# Patient Record
Sex: MALE | Race: WHITE | NOT HISPANIC OR LATINO | Employment: OTHER
[De-identification: names, ages, dates, MRNs, and addresses within clinical notes are randomized per-mention and may not be internally consistent; named-entity substitution may affect disease eponyms.]

---

## 2018-12-03 ENCOUNTER — RECORDS - HEALTHEAST (OUTPATIENT)
Dept: ADMINISTRATIVE | Facility: OTHER | Age: 68
End: 2018-12-03

## 2018-12-07 ENCOUNTER — RECORDS - HEALTHEAST (OUTPATIENT)
Dept: ADMINISTRATIVE | Facility: OTHER | Age: 68
End: 2018-12-07

## 2018-12-17 ENCOUNTER — HOSPITAL ENCOUNTER (OUTPATIENT)
Dept: CARDIOLOGY | Facility: CLINIC | Age: 68
Discharge: HOME | End: 2018-12-17

## 2018-12-17 DIAGNOSIS — I45.10 RBBB: ICD-10-CM

## 2018-12-17 DIAGNOSIS — R07.9 CHEST PAIN: ICD-10-CM

## 2018-12-17 LAB
CV STRESS CURRENT BP HE: NORMAL
CV STRESS CURRENT HR HE: 103
CV STRESS CURRENT HR HE: 107
CV STRESS CURRENT HR HE: 107
CV STRESS CURRENT HR HE: 109
CV STRESS CURRENT HR HE: 110
CV STRESS CURRENT HR HE: 114
CV STRESS CURRENT HR HE: 126
CV STRESS CURRENT HR HE: 132
CV STRESS CURRENT HR HE: 135
CV STRESS CURRENT HR HE: 145
CV STRESS CURRENT HR HE: 146
CV STRESS CURRENT HR HE: 148
CV STRESS CURRENT HR HE: 148
CV STRESS CURRENT HR HE: 153
CV STRESS CURRENT HR HE: 78
CV STRESS CURRENT HR HE: 89
CV STRESS CURRENT HR HE: 92
CV STRESS CURRENT HR HE: 93
CV STRESS CURRENT HR HE: 94
CV STRESS CURRENT HR HE: 94
CV STRESS CURRENT HR HE: 98
CV STRESS DEVIATION TIME HE: NORMAL
CV STRESS ECHO PERCENT HR HE: NORMAL
CV STRESS EXERCISE STAGE HE: NORMAL
CV STRESS EXERCISE STAGE REACHED HE: NORMAL
CV STRESS FINAL RESTING BP HE: NORMAL
CV STRESS FINAL RESTING HR HE: 93
CV STRESS MAX HR HE: 156
CV STRESS MAX TREADMILL GRADE HE: 12
CV STRESS MAX TREADMILL SPEED HE: 2.5
CV STRESS PEAK DIA BP HE: NORMAL
CV STRESS PEAK SYS BP HE: NORMAL
CV STRESS PHASE HE: NORMAL
CV STRESS PROTOCOL HE: NORMAL
CV STRESS RESTING PT POSITION HE: NORMAL
CV STRESS RESTING PT POSITION HE: NORMAL
CV STRESS ST DEVIATION AMOUNT HE: NORMAL
CV STRESS ST DEVIATION ELEVATION HE: NORMAL
CV STRESS ST EVELATION AMOUNT HE: NORMAL
CV STRESS TEST TYPE HE: NORMAL
CV STRESS TOTAL STAGE TIME MIN 1 HE: NORMAL
STRESS ECHO BASELINE BP: NORMAL
STRESS ECHO BASELINE HR: 90
STRESS ECHO CALCULATED PERCENT HR: 102 %
STRESS ECHO LAST STRESS BP: NORMAL
STRESS ECHO LAST STRESS HR: 135
STRESS ECHO POST ESTIMATED WORKLOAD: 6.4
STRESS ECHO POST EXERCISE DUR MIN: 4
STRESS ECHO POST EXERCISE DUR SEC: 31
STRESS ECHO TARGET HR: 156.06

## 2018-12-17 RX ORDER — VARENICLINE TARTRATE 1 MG/1
1 TABLET, FILM COATED ORAL 2 TIMES DAILY
Status: SHIPPED | COMMUNITY
Start: 2018-12-17 | End: 2022-10-04

## 2018-12-20 ENCOUNTER — RECORDS - HEALTHEAST (OUTPATIENT)
Dept: ADMINISTRATIVE | Facility: OTHER | Age: 68
End: 2018-12-20

## 2018-12-28 ENCOUNTER — COMMUNICATION - HEALTHEAST (OUTPATIENT)
Dept: TELEHEALTH | Facility: CLINIC | Age: 68
End: 2018-12-28

## 2018-12-28 ENCOUNTER — HOSPITAL ENCOUNTER (OUTPATIENT)
Dept: CT IMAGING | Facility: HOSPITAL | Age: 68
Discharge: HOME | End: 2018-12-28

## 2018-12-28 ENCOUNTER — HOSPITAL ENCOUNTER (OUTPATIENT)
Dept: CARDIOLOGY | Facility: HOSPITAL | Age: 68
Discharge: HOME | End: 2018-12-28

## 2018-12-28 DIAGNOSIS — J98.4 CHRONIC PULMONARY DISEASE: ICD-10-CM

## 2018-12-28 DIAGNOSIS — R06.00 DYSPNEA: ICD-10-CM

## 2018-12-28 DIAGNOSIS — R07.9 CHEST PAIN: ICD-10-CM

## 2018-12-28 DIAGNOSIS — J44.9 CHRONIC OBSTRUCTIVE PULMONARY DISEASE (COPD) (H): ICD-10-CM

## 2018-12-28 DIAGNOSIS — I51.9 HEART DISEASE: ICD-10-CM

## 2018-12-28 LAB
AORTIC ROOT: 2.6 CM
AORTIC VALVE MEAN VELOCITY: 76.3 CM/S
AV DIMENSIONLESS INDEX VTI: 0.5
AV MEAN GRADIENT: 3 MMHG
AV PEAK GRADIENT: 4.5 MMHG
AV VALVE AREA: 2.3 CM2
AV VELOCITY RATIO: 0.6
BSA FOR ECHO PROCEDURE: 1.89 M2
CV BLOOD PRESSURE: NORMAL MMHG
CV ECHO HEIGHT: 73 IN
CV ECHO WEIGHT: 154 LBS
DOP CALC AO PEAK VEL: 106 CM/S
DOP CALC AO VTI: 23.6 CM
DOP CALC LVOT AREA: 4.52 CM2
DOP CALC LVOT DIAMETER: 2.4 CM
DOP CALC LVOT PEAK VEL: 64.9 CM/S
DOP CALC LVOT STROKE VOLUME: 55.2 CM3
DOP CALCLVOT PEAK VEL VTI: 12.2 CM
ECHO EJECTION FRACTION ESTIMATED: 50 %
EJECTION FRACTION: 60 % (ref 55–75)
FRACTIONAL SHORTENING: 24.7 % (ref 28–44)
INTERVENTRICULAR SEPTUM IN END DIASTOLE: 0.64 CM (ref 0.6–1)
IVS/PW RATIO: 0.9
LA AREA 1: 11.1 CM2
LA AREA 2: 14 CM2
LEFT ATRIUM LENGTH: 3.9 CM
LEFT ATRIUM SIZE: 2.8 CM
LEFT ATRIUM VOLUME INDEX: 17.9 ML/M2
LEFT ATRIUM VOLUME: 33.9 ML
LEFT VENTRICLE DIASTOLIC VOLUME INDEX: 36.7 CM3/M2 (ref 34–74)
LEFT VENTRICLE DIASTOLIC VOLUME: 69.4 CM3 (ref 62–150)
LEFT VENTRICLE MASS INDEX: 53.6 G/M2
LEFT VENTRICLE SYSTOLIC VOLUME INDEX: 14.8 CM3/M2 (ref 11–31)
LEFT VENTRICLE SYSTOLIC VOLUME: 27.9 CM3 (ref 21–61)
LEFT VENTRICULAR INTERNAL DIMENSION IN DIASTOLE: 4.85 CM (ref 4.2–5.8)
LEFT VENTRICULAR INTERNAL DIMENSION IN SYSTOLE: 3.65 CM (ref 2.5–4)
LEFT VENTRICULAR MASS: 101.3 G
LEFT VENTRICULAR OUTFLOW TRACT MEAN GRADIENT: 1 MMHG
LEFT VENTRICULAR OUTFLOW TRACT MEAN VELOCITY: 46.2 CM/S
LEFT VENTRICULAR OUTFLOW TRACT PEAK GRADIENT: 2 MMHG
LEFT VENTRICULAR POSTERIOR WALL IN END DIASTOLE: 0.69 CM (ref 0.6–1)
LV STROKE VOLUME INDEX: 29.2 ML/M2
MITRAL VALVE DECELERATION SLOPE: 1370 MM/S2
MITRAL VALVE E/A RATIO: 0.8
MITRAL VALVE PRESSURE HALF-TIME: 121 MS
MV DECELERATION TIME: 412 MS
MV PEAK A VELOCITY: 69.8 CM/S
MV PEAK E VELOCITY: 56.3 CM/S
MV VALVE AREA PRESSURE 1/2 METHOD: 1.8 CM2
NUC REST DIASTOLIC VOLUME INDEX: 2464 LBS
NUC REST SYSTOLIC VOLUME INDEX: 73 IN
PR MAX PG: 4 MMHG
PR PEAK VELOCITY: 105 CM/S
TRICUSPID REGURGITATION PEAK PRESSURE GRADIENT: 19.9 MMHG
TRICUSPID VALVE ANULAR PLANE SYSTOLIC EXCURSION: 2.2 CM
TRICUSPID VALVE PEAK REGURGITANT VELOCITY: 223 CM/S

## 2018-12-28 ASSESSMENT — MIFFLIN-ST. JEOR: SCORE: 1512.42

## 2021-06-02 VITALS — WEIGHT: 154 LBS | HEIGHT: 73 IN | BODY MASS INDEX: 20.41 KG/M2

## 2022-10-04 ENCOUNTER — ANESTHESIA (OUTPATIENT)
Dept: SURGERY | Facility: CLINIC | Age: 72
End: 2022-10-04
Payer: COMMERCIAL

## 2022-10-04 ENCOUNTER — APPOINTMENT (OUTPATIENT)
Dept: CARDIOLOGY | Facility: CLINIC | Age: 72
End: 2022-10-04
Attending: STUDENT IN AN ORGANIZED HEALTH CARE EDUCATION/TRAINING PROGRAM
Payer: COMMERCIAL

## 2022-10-04 ENCOUNTER — HOSPITAL ENCOUNTER (OUTPATIENT)
Facility: CLINIC | Age: 72
Discharge: HOME | End: 2022-10-05
Attending: EMERGENCY MEDICINE | Admitting: SURGERY
Payer: COMMERCIAL

## 2022-10-04 ENCOUNTER — APPOINTMENT (OUTPATIENT)
Dept: CT IMAGING | Facility: CLINIC | Age: 72
End: 2022-10-04
Attending: EMERGENCY MEDICINE
Payer: COMMERCIAL

## 2022-10-04 ENCOUNTER — ANESTHESIA EVENT (OUTPATIENT)
Dept: SURGERY | Facility: CLINIC | Age: 72
End: 2022-10-04
Payer: COMMERCIAL

## 2022-10-04 DIAGNOSIS — K35.80 ACUTE APPENDICITIS, UNSPECIFIED ACUTE APPENDICITIS TYPE: Primary | ICD-10-CM

## 2022-10-04 DIAGNOSIS — R10.31 ABDOMINAL PAIN, RIGHT LOWER QUADRANT: ICD-10-CM

## 2022-10-04 PROBLEM — I45.10 INCOMPLETE RIGHT BUNDLE BRANCH BLOCK (RBBB) DETERMINED BY ELECTROCARDIOGRAPHY: Status: ACTIVE | Noted: 2018-11-28

## 2022-10-04 PROBLEM — E87.1 HYPONATREMIA: Status: ACTIVE | Noted: 2022-10-04

## 2022-10-04 PROBLEM — D64.9 NORMOCYTIC ANEMIA: Status: ACTIVE | Noted: 2022-10-04

## 2022-10-04 PROBLEM — I45.10 RIGHT BUNDLE BRANCH BLOCK: Status: ACTIVE | Noted: 2022-10-04

## 2022-10-04 PROBLEM — J44.9 CHRONIC OBSTRUCTIVE PULMONARY DISEASE (H): Status: ACTIVE | Noted: 2021-09-15

## 2022-10-04 PROBLEM — E88.09 HYPOALBUMINEMIA: Status: ACTIVE | Noted: 2022-10-04

## 2022-10-04 PROBLEM — R82.90 ABNORMAL URINALYSIS: Status: ACTIVE | Noted: 2022-10-04

## 2022-10-04 PROBLEM — I77.819 AORTIC ECTASIA (H): Status: ACTIVE | Noted: 2021-10-07

## 2022-10-04 PROBLEM — R79.82 ELEVATED C-REACTIVE PROTEIN (CRP): Status: ACTIVE | Noted: 2022-10-04

## 2022-10-04 PROBLEM — K35.209 ACUTE APPENDICITIS WITH GENERALIZED PERITONITIS: Status: ACTIVE | Noted: 2022-10-04

## 2022-10-04 PROBLEM — D72.829 LEUKOCYTOSIS: Status: ACTIVE | Noted: 2022-10-04

## 2022-10-04 PROBLEM — F10.10 ALCOHOL ABUSE: Status: ACTIVE | Noted: 2022-10-04

## 2022-10-04 PROBLEM — N17.9 ACUTE KIDNEY INJURY (H): Status: ACTIVE | Noted: 2022-10-04

## 2022-10-04 PROBLEM — Z72.0 TOBACCO ABUSE: Status: ACTIVE | Noted: 2022-10-04

## 2022-10-04 LAB
ALBUMIN SERPL-MCNC: 3.3 G/DL (ref 3.5–5)
ALBUMIN UR-MCNC: 30 MG/DL
ALP SERPL-CCNC: 51 U/L (ref 45–120)
ALT SERPL W P-5'-P-CCNC: 10 U/L (ref 0–45)
ANION GAP SERPL CALCULATED.3IONS-SCNC: 10 MMOL/L (ref 5–18)
APPEARANCE UR: CLEAR
APTT PPP: 22 SECONDS (ref 22–38)
AST SERPL W P-5'-P-CCNC: 11 U/L (ref 0–40)
ATRIAL RATE - MUSE: 86 BPM
BASOPHILS # BLD AUTO: 0 10E3/UL (ref 0–0.2)
BASOPHILS NFR BLD AUTO: 0 %
BILIRUB SERPL-MCNC: 1.1 MG/DL (ref 0–1)
BILIRUB UR QL STRIP: NEGATIVE
BUN SERPL-MCNC: 21 MG/DL (ref 8–28)
C REACTIVE PROTEIN LHE: 14.2 MG/DL (ref 0–?)
CALCIUM SERPL-MCNC: 9.3 MG/DL (ref 8.5–10.5)
CHLORIDE BLD-SCNC: 101 MMOL/L (ref 98–107)
CO2 SERPL-SCNC: 24 MMOL/L (ref 22–31)
COLOR UR AUTO: ABNORMAL
CREAT SERPL-MCNC: 1.33 MG/DL (ref 0.7–1.3)
DIASTOLIC BLOOD PRESSURE - MUSE: 55 MMHG
EOSINOPHIL # BLD AUTO: 0 10E3/UL (ref 0–0.7)
EOSINOPHIL NFR BLD AUTO: 0 %
ERYTHROCYTE [DISTWIDTH] IN BLOOD BY AUTOMATED COUNT: 12.9 % (ref 10–15)
GFR SERPL CREATININE-BSD FRML MDRD: 57 ML/MIN/1.73M2
GLUCOSE BLD-MCNC: 141 MG/DL (ref 70–125)
GLUCOSE UR STRIP-MCNC: NEGATIVE MG/DL
HCT VFR BLD AUTO: 35.8 % (ref 40–53)
HGB BLD-MCNC: 12.7 G/DL (ref 13.3–17.7)
HGB UR QL STRIP: NEGATIVE
IMM GRANULOCYTES # BLD: 0 10E3/UL
IMM GRANULOCYTES NFR BLD: 0 %
INR PPP: 1.03 (ref 0.85–1.15)
INR PPP: 1.05 (ref 0.85–1.15)
INTERPRETATION ECG - MUSE: NORMAL
KETONES UR STRIP-MCNC: ABNORMAL MG/DL
LACTATE SERPL-SCNC: 1.4 MMOL/L (ref 0.7–2)
LEUKOCYTE ESTERASE UR QL STRIP: NEGATIVE
LIPASE SERPL-CCNC: 23 U/L (ref 0–52)
LVEF ECHO: NORMAL
LYMPHOCYTES # BLD AUTO: 0.6 10E3/UL (ref 0.8–5.3)
LYMPHOCYTES NFR BLD AUTO: 4 %
MCH RBC QN AUTO: 34 PG (ref 26.5–33)
MCHC RBC AUTO-ENTMCNC: 35.5 G/DL (ref 31.5–36.5)
MCV RBC AUTO: 96 FL (ref 78–100)
MONOCYTES # BLD AUTO: 0.8 10E3/UL (ref 0–1.3)
MONOCYTES NFR BLD AUTO: 5 %
NEUTROPHILS # BLD AUTO: 12.9 10E3/UL (ref 1.6–8.3)
NEUTROPHILS NFR BLD AUTO: 91 %
NITRATE UR QL: NEGATIVE
NRBC # BLD AUTO: 0 10E3/UL
NRBC BLD AUTO-RTO: 0 /100
P AXIS - MUSE: 79 DEGREES
PH UR STRIP: 8 [PH] (ref 5–7)
PLATELET # BLD AUTO: 182 10E3/UL (ref 150–450)
POTASSIUM BLD-SCNC: 4.1 MMOL/L (ref 3.5–5)
PR INTERVAL - MUSE: 124 MS
PROT SERPL-MCNC: 7.2 G/DL (ref 6–8)
QRS DURATION - MUSE: 122 MS
QT - MUSE: 372 MS
QTC - MUSE: 445 MS
R AXIS - MUSE: -69 DEGREES
RBC # BLD AUTO: 3.73 10E6/UL (ref 4.4–5.9)
RBC URINE: 3 /HPF
SARS-COV-2 RNA RESP QL NAA+PROBE: NEGATIVE
SODIUM SERPL-SCNC: 135 MMOL/L (ref 136–145)
SP GR UR STRIP: >1.05 (ref 1–1.03)
SYSTOLIC BLOOD PRESSURE - MUSE: 97 MMHG
T AXIS - MUSE: 30 DEGREES
TROPONIN I SERPL-MCNC: <0.01 NG/ML (ref 0–0.29)
UROBILINOGEN UR STRIP-MCNC: 3 MG/DL
VENTRICULAR RATE- MUSE: 86 BPM
WBC # BLD AUTO: 14.3 10E3/UL (ref 4–11)
WBC URINE: 1 /HPF

## 2022-10-04 PROCEDURE — 81001 URINALYSIS AUTO W/SCOPE: CPT | Performed by: EMERGENCY MEDICINE

## 2022-10-04 PROCEDURE — 250N000011 HC RX IP 250 OP 636: Performed by: EMERGENCY MEDICINE

## 2022-10-04 PROCEDURE — 74177 CT ABD & PELVIS W/CONTRAST: CPT

## 2022-10-04 PROCEDURE — 250N000011 HC RX IP 250 OP 636: Performed by: NURSE ANESTHETIST, CERTIFIED REGISTERED

## 2022-10-04 PROCEDURE — 36415 COLL VENOUS BLD VENIPUNCTURE: CPT | Performed by: EMERGENCY MEDICINE

## 2022-10-04 PROCEDURE — 258N000003 HC RX IP 258 OP 636: Performed by: EMERGENCY MEDICINE

## 2022-10-04 PROCEDURE — 250N000025 HC SEVOFLURANE, PER MIN: Performed by: SURGERY

## 2022-10-04 PROCEDURE — G0378 HOSPITAL OBSERVATION PER HR: HCPCS

## 2022-10-04 PROCEDURE — 85610 PROTHROMBIN TIME: CPT | Performed by: EMERGENCY MEDICINE

## 2022-10-04 PROCEDURE — 96361 HYDRATE IV INFUSION ADD-ON: CPT

## 2022-10-04 PROCEDURE — 83605 ASSAY OF LACTIC ACID: CPT | Performed by: EMERGENCY MEDICINE

## 2022-10-04 PROCEDURE — 80053 COMPREHEN METABOLIC PANEL: CPT | Performed by: EMERGENCY MEDICINE

## 2022-10-04 PROCEDURE — 88304 TISSUE EXAM BY PATHOLOGIST: CPT | Mod: TC | Performed by: SURGERY

## 2022-10-04 PROCEDURE — 250N000013 HC RX MED GY IP 250 OP 250 PS 637: Performed by: SURGERY

## 2022-10-04 PROCEDURE — 85025 COMPLETE CBC W/AUTO DIFF WBC: CPT | Performed by: EMERGENCY MEDICINE

## 2022-10-04 PROCEDURE — 258N000003 HC RX IP 258 OP 636: Performed by: NURSE ANESTHETIST, CERTIFIED REGISTERED

## 2022-10-04 PROCEDURE — 370N000017 HC ANESTHESIA TECHNICAL FEE, PER MIN: Performed by: SURGERY

## 2022-10-04 PROCEDURE — 250N000009 HC RX 250: Performed by: NURSE ANESTHETIST, CERTIFIED REGISTERED

## 2022-10-04 PROCEDURE — C9803 HOPD COVID-19 SPEC COLLECT: HCPCS

## 2022-10-04 PROCEDURE — 93306 TTE W/DOPPLER COMPLETE: CPT

## 2022-10-04 PROCEDURE — 99220 PR INITIAL OBSERVATION CARE,LEVEL III: CPT | Performed by: STUDENT IN AN ORGANIZED HEALTH CARE EDUCATION/TRAINING PROGRAM

## 2022-10-04 PROCEDURE — 360N000076 HC SURGERY LEVEL 3, PER MIN: Performed by: SURGERY

## 2022-10-04 PROCEDURE — 44970 LAPAROSCOPY APPENDECTOMY: CPT | Performed by: SURGERY

## 2022-10-04 PROCEDURE — 93306 TTE W/DOPPLER COMPLETE: CPT | Mod: 26 | Performed by: INTERNAL MEDICINE

## 2022-10-04 PROCEDURE — 83690 ASSAY OF LIPASE: CPT | Performed by: EMERGENCY MEDICINE

## 2022-10-04 PROCEDURE — 250N000011 HC RX IP 250 OP 636: Performed by: STUDENT IN AN ORGANIZED HEALTH CARE EDUCATION/TRAINING PROGRAM

## 2022-10-04 PROCEDURE — 84484 ASSAY OF TROPONIN QUANT: CPT | Performed by: EMERGENCY MEDICINE

## 2022-10-04 PROCEDURE — 258N000003 HC RX IP 258 OP 636: Performed by: ANESTHESIOLOGY

## 2022-10-04 PROCEDURE — 96375 TX/PRO/DX INJ NEW DRUG ADDON: CPT | Mod: 59

## 2022-10-04 PROCEDURE — 86140 C-REACTIVE PROTEIN: CPT | Performed by: EMERGENCY MEDICINE

## 2022-10-04 PROCEDURE — 250N000011 HC RX IP 250 OP 636: Performed by: SURGERY

## 2022-10-04 PROCEDURE — 999N000141 HC STATISTIC PRE-PROCEDURE NURSING ASSESSMENT: Performed by: SURGERY

## 2022-10-04 PROCEDURE — 96374 THER/PROPH/DIAG INJ IV PUSH: CPT | Mod: 59

## 2022-10-04 PROCEDURE — 710N000010 HC RECOVERY PHASE 1, LEVEL 2, PER MIN: Performed by: SURGERY

## 2022-10-04 PROCEDURE — 258N000003 HC RX IP 258 OP 636: Performed by: STUDENT IN AN ORGANIZED HEALTH CARE EDUCATION/TRAINING PROGRAM

## 2022-10-04 PROCEDURE — 85730 THROMBOPLASTIN TIME PARTIAL: CPT | Performed by: EMERGENCY MEDICINE

## 2022-10-04 PROCEDURE — 272N000001 HC OR GENERAL SUPPLY STERILE: Performed by: SURGERY

## 2022-10-04 PROCEDURE — 99205 OFFICE O/P NEW HI 60 MIN: CPT | Mod: 57 | Performed by: SURGERY

## 2022-10-04 PROCEDURE — 99285 EMERGENCY DEPT VISIT HI MDM: CPT | Mod: 25

## 2022-10-04 PROCEDURE — U0003 INFECTIOUS AGENT DETECTION BY NUCLEIC ACID (DNA OR RNA); SEVERE ACUTE RESPIRATORY SYNDROME CORONAVIRUS 2 (SARS-COV-2) (CORONAVIRUS DISEASE [COVID-19]), AMPLIFIED PROBE TECHNIQUE, MAKING USE OF HIGH THROUGHPUT TECHNOLOGIES AS DESCRIBED BY CMS-2020-01-R: HCPCS | Performed by: EMERGENCY MEDICINE

## 2022-10-04 PROCEDURE — 93005 ELECTROCARDIOGRAM TRACING: CPT | Performed by: EMERGENCY MEDICINE

## 2022-10-04 RX ORDER — ACETAMINOPHEN 325 MG/1
975 TABLET ORAL EVERY 8 HOURS
Status: DISCONTINUED | OUTPATIENT
Start: 2022-10-04 | End: 2022-10-05 | Stop reason: HOSPADM

## 2022-10-04 RX ORDER — CEFAZOLIN SODIUM/WATER 2 G/20 ML
2 SYRINGE (ML) INTRAVENOUS SEE ADMIN INSTRUCTIONS
Status: DISCONTINUED | OUTPATIENT
Start: 2022-10-04 | End: 2022-10-04 | Stop reason: HOSPADM

## 2022-10-04 RX ORDER — LIDOCAINE 40 MG/G
CREAM TOPICAL
Status: DISCONTINUED | OUTPATIENT
Start: 2022-10-04 | End: 2022-10-04 | Stop reason: HOSPADM

## 2022-10-04 RX ORDER — CEFAZOLIN SODIUM/WATER 2 G/20 ML
2 SYRINGE (ML) INTRAVENOUS
Status: COMPLETED | OUTPATIENT
Start: 2022-10-04 | End: 2022-10-04

## 2022-10-04 RX ORDER — POLYETHYLENE GLYCOL 3350 17 G/17G
17 POWDER, FOR SOLUTION ORAL DAILY
Status: DISCONTINUED | OUTPATIENT
Start: 2022-10-05 | End: 2022-10-05 | Stop reason: HOSPADM

## 2022-10-04 RX ORDER — PROCHLORPERAZINE MALEATE 5 MG
5 TABLET ORAL EVERY 6 HOURS PRN
Status: DISCONTINUED | OUTPATIENT
Start: 2022-10-04 | End: 2022-10-05 | Stop reason: HOSPADM

## 2022-10-04 RX ORDER — SODIUM CHLORIDE, SODIUM LACTATE, POTASSIUM CHLORIDE, CALCIUM CHLORIDE 600; 310; 30; 20 MG/100ML; MG/100ML; MG/100ML; MG/100ML
INJECTION, SOLUTION INTRAVENOUS CONTINUOUS
Status: DISCONTINUED | OUTPATIENT
Start: 2022-10-04 | End: 2022-10-04 | Stop reason: HOSPADM

## 2022-10-04 RX ORDER — PROPOFOL 10 MG/ML
INJECTION, EMULSION INTRAVENOUS PRN
Status: DISCONTINUED | OUTPATIENT
Start: 2022-10-04 | End: 2022-10-04

## 2022-10-04 RX ORDER — MORPHINE SULFATE 4 MG/ML
4 INJECTION, SOLUTION INTRAMUSCULAR; INTRAVENOUS ONCE
Status: COMPLETED | OUTPATIENT
Start: 2022-10-04 | End: 2022-10-04

## 2022-10-04 RX ORDER — ONDANSETRON 2 MG/ML
4 INJECTION INTRAMUSCULAR; INTRAVENOUS EVERY 6 HOURS PRN
Status: DISCONTINUED | OUTPATIENT
Start: 2022-10-04 | End: 2022-10-05 | Stop reason: HOSPADM

## 2022-10-04 RX ORDER — PIPERACILLIN SODIUM, TAZOBACTAM SODIUM 3; .375 G/15ML; G/15ML
3.38 INJECTION, POWDER, LYOPHILIZED, FOR SOLUTION INTRAVENOUS EVERY 8 HOURS
Status: DISCONTINUED | OUTPATIENT
Start: 2022-10-04 | End: 2022-10-05 | Stop reason: HOSPADM

## 2022-10-04 RX ORDER — OXYCODONE HYDROCHLORIDE 5 MG/1
5 TABLET ORAL EVERY 4 HOURS PRN
Status: DISCONTINUED | OUTPATIENT
Start: 2022-10-04 | End: 2022-10-05 | Stop reason: HOSPADM

## 2022-10-04 RX ORDER — BUPIVACAINE HYDROCHLORIDE 2.5 MG/ML
INJECTION, SOLUTION EPIDURAL; INFILTRATION; INTRACAUDAL PRN
Status: DISCONTINUED | OUTPATIENT
Start: 2022-10-04 | End: 2022-10-04 | Stop reason: HOSPADM

## 2022-10-04 RX ORDER — ONDANSETRON 4 MG/1
4 TABLET, ORALLY DISINTEGRATING ORAL EVERY 6 HOURS PRN
Status: DISCONTINUED | OUTPATIENT
Start: 2022-10-04 | End: 2022-10-05 | Stop reason: HOSPADM

## 2022-10-04 RX ORDER — OXYCODONE HYDROCHLORIDE 5 MG/1
5 TABLET ORAL EVERY 4 HOURS PRN
Status: DISCONTINUED | OUTPATIENT
Start: 2022-10-04 | End: 2022-10-04 | Stop reason: HOSPADM

## 2022-10-04 RX ORDER — AMOXICILLIN 250 MG
1 CAPSULE ORAL 2 TIMES DAILY
Status: DISCONTINUED | OUTPATIENT
Start: 2022-10-04 | End: 2022-10-05 | Stop reason: HOSPADM

## 2022-10-04 RX ORDER — HYDROMORPHONE HCL IN WATER/PF 6 MG/30 ML
0.2 PATIENT CONTROLLED ANALGESIA SYRINGE INTRAVENOUS
Status: DISCONTINUED | OUTPATIENT
Start: 2022-10-04 | End: 2022-10-05 | Stop reason: HOSPADM

## 2022-10-04 RX ORDER — MEPERIDINE HYDROCHLORIDE 25 MG/ML
12.5 INJECTION INTRAMUSCULAR; INTRAVENOUS; SUBCUTANEOUS
Status: DISCONTINUED | OUTPATIENT
Start: 2022-10-04 | End: 2022-10-04 | Stop reason: HOSPADM

## 2022-10-04 RX ORDER — ONDANSETRON 2 MG/ML
4 INJECTION INTRAMUSCULAR; INTRAVENOUS EVERY 30 MIN PRN
Status: DISCONTINUED | OUTPATIENT
Start: 2022-10-04 | End: 2022-10-04 | Stop reason: HOSPADM

## 2022-10-04 RX ORDER — ONDANSETRON 2 MG/ML
INJECTION INTRAMUSCULAR; INTRAVENOUS PRN
Status: DISCONTINUED | OUTPATIENT
Start: 2022-10-04 | End: 2022-10-04

## 2022-10-04 RX ORDER — FENTANYL CITRATE 50 UG/ML
25 INJECTION, SOLUTION INTRAMUSCULAR; INTRAVENOUS EVERY 5 MIN PRN
Status: DISCONTINUED | OUTPATIENT
Start: 2022-10-04 | End: 2022-10-04 | Stop reason: HOSPADM

## 2022-10-04 RX ORDER — IOPAMIDOL 755 MG/ML
75 INJECTION, SOLUTION INTRAVASCULAR ONCE
Status: COMPLETED | OUTPATIENT
Start: 2022-10-04 | End: 2022-10-04

## 2022-10-04 RX ORDER — FENTANYL CITRATE 50 UG/ML
INJECTION, SOLUTION INTRAMUSCULAR; INTRAVENOUS PRN
Status: DISCONTINUED | OUTPATIENT
Start: 2022-10-04 | End: 2022-10-04

## 2022-10-04 RX ORDER — ONDANSETRON 4 MG/1
4 TABLET, ORALLY DISINTEGRATING ORAL EVERY 30 MIN PRN
Status: DISCONTINUED | OUTPATIENT
Start: 2022-10-04 | End: 2022-10-04 | Stop reason: HOSPADM

## 2022-10-04 RX ORDER — PIPERACILLIN SODIUM, TAZOBACTAM SODIUM 3; .375 G/15ML; G/15ML
3.38 INJECTION, POWDER, LYOPHILIZED, FOR SOLUTION INTRAVENOUS ONCE
Status: COMPLETED | OUTPATIENT
Start: 2022-10-04 | End: 2022-10-04

## 2022-10-04 RX ORDER — DEXAMETHASONE SODIUM PHOSPHATE 10 MG/ML
INJECTION, SOLUTION INTRAMUSCULAR; INTRAVENOUS PRN
Status: DISCONTINUED | OUTPATIENT
Start: 2022-10-04 | End: 2022-10-04

## 2022-10-04 RX ORDER — BISACODYL 10 MG
10 SUPPOSITORY, RECTAL RECTAL DAILY PRN
Status: DISCONTINUED | OUTPATIENT
Start: 2022-10-04 | End: 2022-10-05 | Stop reason: HOSPADM

## 2022-10-04 RX ORDER — LIDOCAINE 40 MG/G
CREAM TOPICAL
Status: DISCONTINUED | OUTPATIENT
Start: 2022-10-04 | End: 2022-10-05 | Stop reason: HOSPADM

## 2022-10-04 RX ORDER — ACETAMINOPHEN 325 MG/1
650 TABLET ORAL EVERY 4 HOURS PRN
Status: DISCONTINUED | OUTPATIENT
Start: 2022-10-07 | End: 2022-10-05 | Stop reason: HOSPADM

## 2022-10-04 RX ORDER — HYDROMORPHONE HCL IN WATER/PF 6 MG/30 ML
0.2 PATIENT CONTROLLED ANALGESIA SYRINGE INTRAVENOUS EVERY 5 MIN PRN
Status: DISCONTINUED | OUTPATIENT
Start: 2022-10-04 | End: 2022-10-04 | Stop reason: HOSPADM

## 2022-10-04 RX ORDER — AMOXICILLIN 250 MG
1 CAPSULE ORAL 2 TIMES DAILY PRN
Status: DISCONTINUED | OUTPATIENT
Start: 2022-10-04 | End: 2022-10-05 | Stop reason: HOSPADM

## 2022-10-04 RX ORDER — FENTANYL CITRATE 50 UG/ML
25 INJECTION, SOLUTION INTRAMUSCULAR; INTRAVENOUS
Status: DISCONTINUED | OUTPATIENT
Start: 2022-10-04 | End: 2022-10-04

## 2022-10-04 RX ORDER — FENTANYL CITRATE 50 UG/ML
50 INJECTION, SOLUTION INTRAMUSCULAR; INTRAVENOUS
Status: DISCONTINUED | OUTPATIENT
Start: 2022-10-04 | End: 2022-10-04 | Stop reason: HOSPADM

## 2022-10-04 RX ORDER — LIDOCAINE HYDROCHLORIDE 10 MG/ML
INJECTION, SOLUTION INFILTRATION; PERINEURAL PRN
Status: DISCONTINUED | OUTPATIENT
Start: 2022-10-04 | End: 2022-10-04

## 2022-10-04 RX ORDER — AMOXICILLIN 250 MG
2 CAPSULE ORAL 2 TIMES DAILY PRN
Status: DISCONTINUED | OUTPATIENT
Start: 2022-10-04 | End: 2022-10-05 | Stop reason: HOSPADM

## 2022-10-04 RX ORDER — ACETAMINOPHEN 325 MG/1
975 TABLET ORAL ONCE
Status: COMPLETED | OUTPATIENT
Start: 2022-10-04 | End: 2022-10-04

## 2022-10-04 RX ORDER — ONDANSETRON 2 MG/ML
4 INJECTION INTRAMUSCULAR; INTRAVENOUS ONCE
Status: COMPLETED | OUTPATIENT
Start: 2022-10-04 | End: 2022-10-04

## 2022-10-04 RX ORDER — ENOXAPARIN SODIUM 100 MG/ML
40 INJECTION SUBCUTANEOUS EVERY 24 HOURS
Status: DISCONTINUED | OUTPATIENT
Start: 2022-10-05 | End: 2022-10-05 | Stop reason: HOSPADM

## 2022-10-04 RX ADMIN — DEXAMETHASONE SODIUM PHOSPHATE 10 MG: 10 INJECTION, SOLUTION INTRAMUSCULAR; INTRAVENOUS at 16:48

## 2022-10-04 RX ADMIN — PHENYLEPHRINE HYDROCHLORIDE 200 MCG: 10 INJECTION INTRAVENOUS at 16:49

## 2022-10-04 RX ADMIN — ONDANSETRON 4 MG: 2 INJECTION INTRAMUSCULAR; INTRAVENOUS at 05:30

## 2022-10-04 RX ADMIN — Medication 2 G: at 16:30

## 2022-10-04 RX ADMIN — LIDOCAINE HYDROCHLORIDE 4 ML: 10 INJECTION, SOLUTION INFILTRATION; PERINEURAL at 16:39

## 2022-10-04 RX ADMIN — PIPERACILLIN AND TAZOBACTAM 3.38 G: 3; .375 INJECTION, POWDER, FOR SOLUTION INTRAVENOUS at 22:47

## 2022-10-04 RX ADMIN — HYDROMORPHONE HYDROCHLORIDE 0.5 MG: 1 INJECTION, SOLUTION INTRAMUSCULAR; INTRAVENOUS; SUBCUTANEOUS at 17:14

## 2022-10-04 RX ADMIN — FENTANYL CITRATE 100 MCG: 50 INJECTION, SOLUTION INTRAMUSCULAR; INTRAVENOUS at 16:39

## 2022-10-04 RX ADMIN — SODIUM CHLORIDE 1000 ML: 9 INJECTION, SOLUTION INTRAVENOUS at 09:37

## 2022-10-04 RX ADMIN — ACETAMINOPHEN 975 MG: 325 TABLET, FILM COATED ORAL at 16:02

## 2022-10-04 RX ADMIN — SODIUM CHLORIDE 500 ML: 9 INJECTION, SOLUTION INTRAVENOUS at 15:03

## 2022-10-04 RX ADMIN — SENNOSIDES AND DOCUSATE SODIUM 1 TABLET: 50; 8.6 TABLET ORAL at 19:21

## 2022-10-04 RX ADMIN — PHENYLEPHRINE HYDROCHLORIDE 200 MCG: 10 INJECTION INTRAVENOUS at 16:52

## 2022-10-04 RX ADMIN — MORPHINE SULFATE 4 MG: 4 INJECTION, SOLUTION INTRAMUSCULAR; INTRAVENOUS at 05:31

## 2022-10-04 RX ADMIN — SODIUM CHLORIDE, POTASSIUM CHLORIDE, SODIUM LACTATE AND CALCIUM CHLORIDE: 600; 310; 30; 20 INJECTION, SOLUTION INTRAVENOUS at 16:05

## 2022-10-04 RX ADMIN — ACETAMINOPHEN 975 MG: 325 TABLET, FILM COATED ORAL at 19:21

## 2022-10-04 RX ADMIN — SUGAMMADEX 200 MG: 100 INJECTION, SOLUTION INTRAVENOUS at 17:11

## 2022-10-04 RX ADMIN — IOPAMIDOL 75 ML: 755 INJECTION, SOLUTION INTRAVENOUS at 06:41

## 2022-10-04 RX ADMIN — MIDAZOLAM 1 MG: 1 INJECTION INTRAMUSCULAR; INTRAVENOUS at 16:39

## 2022-10-04 RX ADMIN — ROCURONIUM BROMIDE 50 MG: 10 INJECTION, SOLUTION INTRAVENOUS at 16:39

## 2022-10-04 RX ADMIN — PIPERACILLIN AND TAZOBACTAM 3.38 G: 3; .375 INJECTION, POWDER, FOR SOLUTION INTRAVENOUS at 15:00

## 2022-10-04 RX ADMIN — SODIUM CHLORIDE 1000 ML: 9 INJECTION, SOLUTION INTRAVENOUS at 05:28

## 2022-10-04 RX ADMIN — ONDANSETRON 4 MG: 2 INJECTION INTRAMUSCULAR; INTRAVENOUS at 16:53

## 2022-10-04 RX ADMIN — PIPERACILLIN AND TAZOBACTAM 3.38 G: 3; .375 INJECTION, POWDER, FOR SOLUTION INTRAVENOUS at 08:15

## 2022-10-04 RX ADMIN — PROPOFOL 160 MG: 10 INJECTION, EMULSION INTRAVENOUS at 16:39

## 2022-10-04 RX ADMIN — MIDAZOLAM 1 MG: 1 INJECTION INTRAMUSCULAR; INTRAVENOUS at 16:33

## 2022-10-04 ASSESSMENT — ACTIVITIES OF DAILY LIVING (ADL)
ADLS_ACUITY_SCORE: 35
ADLS_ACUITY_SCORE: 20
ADLS_ACUITY_SCORE: 35
ADLS_ACUITY_SCORE: 20
ADLS_ACUITY_SCORE: 20
ADLS_ACUITY_SCORE: 35
ADLS_ACUITY_SCORE: 20
ADLS_ACUITY_SCORE: 35
ADLS_ACUITY_SCORE: 35

## 2022-10-04 NOTE — OP NOTE
Name:  Tommie Rizo  PCP:  No Ref-Primary, Physician  Procedure Date:  10/4/2022      Procedure(s):  APPENDECTOMY, LAPAROSCOPIC    Pre-Procedure Diagnosis:  Acute appendicitis, unspecified acute appendicitis type [K35.80]     Post-Procedure Diagnosis:    Necrotic appendicitis    Surgeon(s):  Devon Damian DO    Circulator: Adrianne Marsh RN  Scrub Person: Kely Cárdenas; Niurka Kraft    Anesthesia Type:  GET      Findings:  Necrotic appendicitis with suppurative exudate    Operative Report:    The patient was taken to the operating room and placed in a supine position.  SCDs were placed on bilateral lower extremities.  Antibiotics were given prior to skin incision.  The abdomen was prepped and draped in a sterile fashion.    I began the first portion of the procedure by injecting quarter percent Marcaine with epinephrine into the infraumbilical position.  I then made a 5 mm transverse incision below the umbilicus and established a pneumoperitoneum using a Veress needle technique.  Once the pneumoperitoneum was established,I placed a 5 mm trocar with a 5 mm 30  camera into the abdomen.  The surrounding structures were scrutinized for any injuries upon entry.  I did not find any. I placed an 12 mm trocar in the left lower quadrant position as well as 1 suprapubic 5 mm trochars under direct visualization.  All trochars were placed after local anesthetic was injected to the fascial layers.      I then manipulated the large and small bowel to gain access to the appendix.  The appendix appeared to demonstrate suppurative exudate with a midportion that was necrotic.  I was able to gain access to the base of the appendix,  once found, I made a window at the base of the appendix and then fired a 45 mm blue load stapler across the base the mesoappendix.  I then fired an additional 45 mm white load stapler across the mesoappendix.  I bolster this with additional 10 mm clips to ensure hemostasis at the  staple line.  I then placed the appendix in an 10 mm Endo Catch bag and brought out the left lower quadrant incision.  The abdomen was irrigated with normal saline.  Next I closed the 12 mm fascial defect with an 0 Vicryl suture.  I removed all trochars and deflated the abdomen.  I then closed all skin edges with 4-0 Monocryl subcuticular sutures.  The wounds were then cleaned and covered with a dry, sterile dressing.  All sponge counts and needle counts were correct at the end of the procedure.    Estimated Blood Loss:   10cc    Specimens:    ID Type Source Tests Collected by Time Destination   1 :  Tissue Appendix SURGICAL PATHOLOGY EXAM Devon Damian DO 10/4/2022  5:13 PM           Drains:        Complications:    None    Devon Damian DO

## 2022-10-04 NOTE — H&P
[facility]    History and Physical - Hospitalist Service       Date of Admission:  10/4/2022    Addendum - discussed with the ED physician and patient is being admitted to inpatient (in setting of scheduling surgery)  - ordered admission to inpatient  - repeat dose of zosyn given scheduling delay in timing    Assessment & Plan        72-year-old gentleman with a past medical history of COPD and tobacco and alcohol abuse admitted to the hospital with acute appendicitis; general surgery guiding intervention plan, and internal medicine admitting to observation and providing medical clearance.    Principal Problem:    Acute appendicitis with generalized peritonitis    Leukocytosis, Elevated C-reactive protein (CRP)  Clinically consistent based on exam, history, labs, as well as imaging demonstrating acute appendicitis. IM evaluating and medically clearing him for the laparoscopic appendectomy. Would be ideal to obtain a f/u echo if possible and emphasizing respiratory support with anesthesia/pacu cares given likely copd. He is not on any medications.    -appreciate general surgery guarding treatment plan  -Ongoing emergency department PRNs as ordered  -Nausea and pain control  -Avoid oversedation with medications  -medically cleared but ordering echo given prior echo and rbbb noted on ekg  - keep NPO     Right bundle branch block  -Ordered a repeat echocardiogram, noted EKG demonstrating right bundle branch block, and prior history of chest pain and previously noted echocardiogram (some TR and effusion but no tamponade)      Chronic obstructive pulmonary disease (H)  -This is documented in chart, though the patient did not recall this previous diagnosis   -consistent with exam  -Monitor very closely perioperatively given likely with COPD and possibly bronchitis given on/off coughs      Normocytic anemia  -No active signs of bleeding  -Could consider further studies such as iron and vitamin  panel  -Would transfuse if dips below 7      Hyponatremia  -Very mild, likely related to decreased p.o.  - noted with soft blood pressures during interview  -bolus 500cc NS      Acute kidney injury (H)  -Likely prerenal azotemia related to decreased p.o. intake  -monitor renal function going forward given contrast load w/ CT  -Follow-up labs      Hypoalbuminemia  -Likely related to decreased p.o. and possibly malnutrition  - consider dietician consult if pt stays      Abnormal urinalysis  -Trace ketones noted likely from decreased p.o. and possibly malnutrition   - some RBCs noted; follow clinically      Alcohol abuse  - consuming >14 etoh drinks/weekl  - discussed safe etoh consumption and decreasing    Tobacco abuse  - smoked for >50 years and previously 1ppd and now 1/2 ppd  - encouraged cessation    Diet:   NPO  DVT Prophylaxis: Low Risk/Ambulatory with no VTE prophylaxis indicated  Crews Catheter: Not present  Central Lines: None  Cardiac Monitoring: None  Code Status:   FULL            # Hypoalbuminemia: Albumin = 3.3 g/dL (Ref range: 3.5 - 5.0 g/dL) on admission, will monitor as appropriate              Disposition Plan      Expected Discharge Date: 10/05/2022        Discharge Comments: Today or tomorrow        The patient's care was discussed with the ED physician, the patient, and his wife..    Greg Harper MD  Hospitalist Service  [facility]  Securely message with the Vocera Web Console (learn more here)  Text page via [employer] Paging/Directory         ______________________________________________________________________    Chief Complaint   Abdominal pain    History is obtained from the patient and his wife    History of Present Illness     Tommie Rizo is a 72 year old male admitted on 10/4/2022. He has a past medical history of COPD, ongoing tobacco use, who presented to the emergency department with 2 days of intermittent and progressive right lower quadrant pain.  He has  had associated nausea.  No fevers, no other noted changes with lifestyle or new medications.  He does not go to the doctor frequently but has tried to have regular annual physicals.  He noted an episode of chest pain in the past and recalls an echocardiogram a few years ago.  Per chart, echocardiogram from December 2018 demonstrated EF of 50%, mild tricuspid regurgitation, and trace pericardial effusion but no tamponade. He does not recall history of COPD but this is documented in the chart.     Regarding his functional capacity, the patient states that he is able to get around home, but his wife clarifies that he will occasionally become short of breath with ambulation, even on flat ground, after a large distance.  He believes that he can ambulate up 1 or 2 flights of stairs, but his wife states that he will become a bit short of breath ambulating up 1 flight of stairs.  He presently does not have any shortness of breath or chest pain.  He notes that the right lower quadrant pain is quite intense, but currently is without active nausea.  Medications in ER is helping.  He has no prior history of passing out, nor any episodes of syncope, no previous history or family history of seizures.  He currently does have intermittent coughs.    He still smokes about half a pack a day, for most of his life he smoked 1 pack/day over the course of at least 50 years.  Reports consuming more than the recommended amount of alcohol, states that it is more than 14 drinks per week, but states that he has slowed down on his alcohol consumption over the past few months.  He lives with his wife.  Confirms full code.      Review of Systems    The 10 point Review of Systems is negative other than noted in the HPI or here.      Past Medical History    I have reviewed this patient's medical history and updated it with pertinent information if needed.   Past Medical History:   Diagnosis Date     Alcohol abuse      Tobacco abuse        Past  Surgical History   I have reviewed this patient's surgical history and updated it with pertinent information if needed.  No past surgical history on file.    Social History   I have reviewed this patient's social history and updated it with pertinent information if needed.  Social History     Tobacco Use     Smoking status: Current Every Day Smoker     Packs/day: 1.00     Years: 50.00     Pack years: 50.00     Types: Cigarettes     Smokeless tobacco: Never Used   Substance Use Topics     Alcohol use: Yes     Comment: >14 drinks / week       Family History   I have reviewed this patient's family history and updated it with pertinent information if needed.  Family History   Problem Relation Age of Onset     No Known Problems Mother      No Known Problems Father      Cardiac Sudden Death No family hx of      Myocardial Infarction No family hx of      No significant family history, including no history of: sudden cardiac death, seizure or syncope    Prior to Admission Medications   Prior to Admission Medications   Prescriptions Last Dose Informant Patient Reported? Taking?   varenicline (CHANTIX) 1 mg tablet   Yes No   Sig: [VARENICLINE (CHANTIX) 1 MG TABLET] Take 1 mg by mouth 2 (two) times a day Pt hasn't started taking yet.            Facility-Administered Medications: None     Allergies   No Known Allergies    Physical Exam   Vital Signs: Temp: 99.2  F (37.3  C) Temp src: Temporal BP: 94/55 Pulse: 73   Resp: 16 SpO2: 95 % O2 Device: None (Room air)    Weight: 160 lbs 0 oz    Constitutional: awake, alert, cooperative, no apparent distress, and appears stated age  Eyes: Lids and lashes normal, pupils equal, round and reactive to light, extra ocular muscles intact, sclera clear, conjunctiva normal  ENT: Normocephalic, without obvious abnormality, atraumatic, sinuses nontender on palpation, external ears without lesions, oral pharynx with moist mucous membranes, tonsils without erythema or exudates, gums normal and good  dentition., poor dentition  Hematologic / Lymphatic: no cervical lymphadenopathy  Respiratory: No increased work of breathing, good air exchange, expiratory wheeze, faint but more prominent on left side, otherwise clear elsewhere, no rales  Cardiovascular: Normal apical impulse, regular rate and rhythm, normal S1 and S2, no S3 or S4, and no murmur noted, no edema and pulses 2 plus all extermities bilaterally  GI: No scars, normal bowel sounds, soft, non-distended, non-tender, no masses palpated, no hepatosplenomegally, hypoactive bowel sounds, distended, tenderness noted in the right lower quadrant and no masses palpated, no hepatosplenomegally  Skin: no bruising or bleeding and normal skin color, texture, turgor  Musculoskeletal: There is no redness, warmth, or swelling of the joints.  Full range of motion noted.  Motor strength is 5 out of 5 all extremities bilaterally.  Tone is normal.  Neurologic: Awake, alert, oriented to name, place and time.  Cranial nerves II-XII are grossly intact.  Motor is 5 out of 5 bilaterally.  Cerebellar finger to nose, heel to shin intact.  Sensory is intact.  Babinski down going, Romberg negative, and gait is normal.  Mental Status Exam:  Level of Alertness:   awake  Orientation:   person, place, time  Memory:   normal  Cranial Nerves:  cranial nerves II-XII are grossly intact  Neuropsychiatric: General: normal, calm and normal eye contact  Level of consciousness: alert / normal  Affect: normal  Orientation: oriented to self, place, time and situation    Data   Data reviewed today: I reviewed all medications, new labs and imaging results over the last 24 hours. I personally reviewed the EKG tracing showing RBBB; Lead II has non diagnostic and nospecific ST segment depression, otherwise no ST segment elevations or depressions elsewhere.    Recent Labs   Lab 10/04/22  1144 10/04/22  0521   WBC  --  14.3*   HGB  --  12.7*   MCV  --  96   PLT  --  182   INR 1.03 1.05   NA  --  135*    POTASSIUM  --  4.1   CHLORIDE  --  101   CO2  --  24   BUN  --  21   CR  --  1.33*   ANIONGAP  --  10   MAXINE  --  9.3   GLC  --  141*   ALBUMIN  --  3.3*   PROTTOTAL  --  7.2   BILITOTAL  --  1.1*   ALKPHOS  --  51   ALT  --  10   AST  --  11   LIPASE  --  23     Recent Results (from the past 24 hour(s))   CT Abdomen Pelvis w Contrast    Narrative    EXAM: CT ABDOMEN PELVIS W CONTRAST  LOCATION: [facility]  DATE/TIME: 10/4/2022 6:49 AM    INDICATION: Right lower quadrant abdominal pain  COMPARISON: CT 12/28/2018  TECHNIQUE: CT scan of the abdomen and pelvis was performed following injection of IV contrast. Multiplanar reformats were obtained. Dose reduction techniques were used.  CONTRAST: 75ml Isovue 370    FINDINGS:   LOWER CHEST: Moderate emphysema. No infiltrates or effusions.    HEPATOBILIARY: Normal.    PANCREAS: Normal.    SPLEEN: Normal.    ADRENAL GLANDS: Unchanged left adrenal nodule, no further follow-up. Right adrenal gland negative.    KIDNEYS/BLADDER: Symmetric renal enhancement. Left upper pole renal cyst, present on the prior study, therefore no further follow-up. Right lower pole renal cyst. Multiple subcentimeter low-attenuation lesions within both kidneys for which no further   follow-up recommendations are necessary. No urinary collecting system dilatation or calculi. Bladder unremarkable.    BOWEL: Acute appendicitis with enlargement of the appendix at 1.2 cm with wall thickening and moderate surrounding inflammatory change. No perforation or abscess formation. No bowel dilatation. Colonic diverticulosis.    LYMPH NODES: No lymphadenopathy.    VASCULATURE: Normal caliber aorta. Minimal vascular calcification.    PELVIC ORGANS: Unremarkable.    MUSCULOSKELETAL: Minimal to moderate degenerative disc changes throughout the lower thoracic and lumbar spine.      Impression    IMPRESSION:   1.  Evidence for acute appendicitis with appendiceal dilatation at 1.2 cm with wall  thickening and moderate surrounding inflammatory change. No evidence for luminal appendicolith.    2.  No perforation or abscess formation.    3.  Colonic diverticulosis without diverticulitis.    4.  Moderate bibasilar emphysema.    5.  Left adrenal nodule, no further follow-up, given its stability since prior study.

## 2022-10-04 NOTE — ED TRIAGE NOTES
Here for right sided abdominal pain that started yesterday, describes as sharp, positive for N/V   Also reporting loose stools and weakness/dizziness this morning that caused three falls today      Triage Assessment     Row Name 10/04/22 0501       Triage Assessment (Adult)    Airway WDL WDL       Respiratory WDL    Respiratory WDL WDL       Skin Circulation/Temperature WDL    Skin Circulation/Temperature WDL WDL       Cardiac WDL    Cardiac WDL WDL       Peripheral/Neurovascular WDL    Peripheral Neurovascular WDL WDL       Cognitive/Neuro/Behavioral WDL    Cognitive/Neuro/Behavioral WDL WDL

## 2022-10-04 NOTE — ED NOTES
Introduced self to patient and whiteboard updated.  Hourly rounding explained.  Call light in reach.  Plan of care and expected length of stay explained.  Will continue to monitor.  Pt ambulatory to the restroom.  Awaiting CT results.

## 2022-10-04 NOTE — PHARMACY-ADMISSION MEDICATION HISTORY
Pharmacy Note - Admission Medication History    Pertinent Provider Information:      ______________________________________________________________________    Prior To Admission (PTA) med list completed and updated in EMR.       No outpatient medications have been marked as taking for the 10/4/22 encounter (Hospital Encounter).       Information source(s): Patient and CareEverywhere/SureScripts  Method of interview communication: in-person    Summary of Changes to PTA Med List  New: none  Discontinued: Chantix  Changed: none    Patient was asked about OTC/herbal products specifically.  PTA med list reflects this.      Allergies were reviewed, assessed, and updated with the patient.      Patient does not use any multi-dose medications prior to admission.    The information provided in this note is only as accurate as the sources available at the time of the update(s).    Thank you for the opportunity to participate in the care of this patient.    Hiro Yang RPH  10/4/2022 2:21 PM

## 2022-10-04 NOTE — ANESTHESIA CARE TRANSFER NOTE
Patient: Tommie Rizo    Procedure: Procedure(s):  APPENDECTOMY, LAPAROSCOPIC       Diagnosis: Acute appendicitis, unspecified acute appendicitis type [K35.80]  Diagnosis Additional Information: No value filed.    Anesthesia Type:   General     Note:    Oropharynx: oropharynx clear of all foreign objects and spontaneously breathing  Level of Consciousness: awake  Oxygen Supplementation: face mask  Level of Supplemental Oxygen (L/min / FiO2): 8  Independent Airway: airway patency satisfactory and stable    Vital Signs Stable: post-procedure vital signs reviewed and stable  Report to RN Given: handoff report given  Patient transferred to: PACU    Handoff Report: Identifed the Patient, Identified the Reponsible Provider, Reviewed the pertinent medical history, Discussed the surgical course, Reviewed Intra-OP anesthesia mangement and issues during anesthesia, Set expectations for post-procedure period and Allowed opportunity for questions and acknowledgement of understanding      Vitals:  Vitals Value Taken Time   /68    Temp 98.7    Pulse 79 10/04/22 1724   Resp 18 10/04/22 1724   SpO2 100 % 10/04/22 1724   Vitals shown include unvalidated device data.    Electronically Signed By: CINDY BETH CRNA  October 4, 2022  5:25 PM

## 2022-10-04 NOTE — ANESTHESIA PREPROCEDURE EVALUATION
Anesthesia Pre-Procedure Evaluation    Patient: Tommie Rizo   MRN: [id number] : 1950        Procedure : Procedure(s):  APPENDECTOMY, LAPAROSCOPIC          Past Medical History:   Diagnosis Date     Alcohol abuse      Tobacco abuse       History reviewed. No pertinent surgical history.   No Known Allergies   Social History     Tobacco Use     Smoking status: Current Every Day Smoker     Packs/day: 1.00     Years: 50.00     Pack years: 50.00     Types: Cigarettes     Smokeless tobacco: Never Used   Substance Use Topics     Alcohol use: Yes     Comment: >14 drinks / week      Wt Readings from Last 1 Encounters:   10/04/22 65.5 kg (144 lb 6.4 oz)        Anesthesia Evaluation            ROS/MED HX  ENT/Pulmonary:       Neurologic:  - neg neurologic ROS     Cardiovascular:  - neg cardiovascular ROS     METS/Exercise Tolerance:     Hematologic:  - neg hematologic  ROS     Musculoskeletal:  - neg musculoskeletal ROS     GI/Hepatic:  - neg GI/hepatic ROS     Renal/Genitourinary:       Endo:  - neg endo ROS     Psychiatric/Substance Use:  - neg psychiatric ROS     Infectious Disease:  - neg infectious disease ROS     Malignancy:  - neg malignancy ROS     Other:  - neg other ROS          Physical Exam    Airway  airway exam normal      Mallampati: II       Respiratory Devices and Support         Dental  no notable dental history         Cardiovascular   cardiovascular exam normal          Pulmonary   pulmonary exam normal                OUTSIDE LABS:  CBC:   Lab Results   Component Value Date    WBC 14.3 (H) 10/04/2022    HGB 12.7 (L) 10/04/2022    HCT 35.8 (L) 10/04/2022     10/04/2022     BMP:   Lab Results   Component Value Date     (L) 10/04/2022    POTASSIUM 4.1 10/04/2022    CHLORIDE 101 10/04/2022    CO2 24 10/04/2022    BUN 21 10/04/2022    CR 1.33 (H) 10/04/2022     (H) 10/04/2022     COAGS:   Lab Results   Component Value Date    PTT 22 10/04/2022    INR 1.03 10/04/2022     POC: No  results found for: BGM, HCG, HCGS  HEPATIC:   Lab Results   Component Value Date    ALBUMIN 3.3 (L) 10/04/2022    PROTTOTAL 7.2 10/04/2022    ALT 10 10/04/2022    AST 11 10/04/2022    ALKPHOS 51 10/04/2022    BILITOTAL 1.1 (H) 10/04/2022     OTHER:   Lab Results   Component Value Date    LACT 1.4 10/04/2022    MAXINE 9.3 10/04/2022    LIPASE 23 10/04/2022    CRP 14.2 (H) 10/04/2022       Anesthesia Plan    ASA Status:  3, emergent       Anesthesia Type: General.     - Airway: ETT              Consents    Anesthesia Plan(s) and associated risks, benefits, and realistic alternatives discussed. Questions answered and patient/representative(s) expressed understanding.    - Discussed:     - Discussed with:  Patient         Postoperative Care            Comments:                Logan Alejandre MD

## 2022-10-04 NOTE — CONSULTS
General Surgery Consult    Tommie Rizo MRN# [id number]     Date of Admission: 10/4/2022    Reason for Consult  Acute appendicitis    History of Present Illness  Is a 72-year-old man who presents with 2 days of intermittent right lower quadrant pain.  Pain has been always located in the right side.  He has had some vomiting and diarrhea.  He denies fevers or chills.  His work-up including a CT is consistent with acute appendicitis.  The patient is a current smoker and has history of heavy alcohol usage.  He does not see a physician frequently but did have a physical couple years ago that was unremarkable.  He has had his liver and his lungs looked out in the past which he was told were normal.  He does not currently take any prescription medications.    Past Medical History:  No past medical history on file.    Past Surgical History:  No prior abdominal surgeries  No past surgical history on file.    Allergies:   No Known Allergies  Medications:  No current facility-administered medications on file prior to encounter.  varenicline (CHANTIX) 1 mg tablet, [VARENICLINE (CHANTIX) 1 MG TABLET] Take 1 mg by mouth 2 (two) times a day Pt hasn't started taking yet.            Social History:  Smoking and alcohol usage  Social History     Socioeconomic History     Marital status:      Spouse name: Not on file     Number of children: Not on file     Years of education: Not on file     Highest education level: Not on file   Occupational History     Not on file   Tobacco Use     Smoking status: Current Every Day Smoker     Packs/day: 1.00     Types: Cigarettes     Smokeless tobacco: Not on file   Substance and Sexual Activity     Alcohol use: Not on file     Drug use: Not on file     Sexual activity: Not on file   Other Topics Concern     Not on file   Social History Narrative     Not on file     Social Determinants of Health     Financial Resource Strain: Not on file   Food Insecurity: Not on file   Transportation  Needs: Not on file   Physical Activity: Not on file   Stress: Not on file   Social Connections: Not on file   Intimate Partner Violence: Not on file   Housing Stability: Not on file     Family History:  No family history on file.    ROS:  12 point review negative except as stated in H&P    Exam:  BP (!) 89/50   Pulse 78   Temp 99.2  F (37.3  C) (Temporal)   Resp 16   Wt 72.6 kg (160 lb)   SpO2 96%   BMI 21.11 kg/m    General: Thin elderly appearing alert, interactive, NAD  HEENT: Sclera nonicteric  Resp: Non-labored breathing  Cardiac: RRR  Abdomen: Soft and nondistended.  Tender focally in the right lower quadrant    Labs:   Personally reviewed  T bili 1.1  INR 1.05  Albumin 3.3  Sodium 135  Creatinine 1.33  I calculate his meld to be 10, and a Ebonie class A  WBC 14.3    Imaging:   Personally reviewed  CT abd/pel  IMPRESSION:   1.  Evidence for acute appendicitis with appendiceal dilatation at 1.2 cm with wall thickening and moderate surrounding inflammatory change. No evidence for luminal appendicolith.     2.  No perforation or abscess formation.    Assessment: 72 year old male presenting with acute appendicitis.  Although he has no documented medical issues, he does not see  Frequently.  He does have mildly elevated creatinine and bilirubin and I suspect he has some degree of underlying liver disease.  We discussed typical treatment of surgery.  We discussed the risks of surgery which include bleeding, infection, and injury to surrounding structures.  We also discussed the potential increased risk if there is liver disease.  His full set of labs allows a good evaluation of his liver function and have commented above on his meld and child's class.  I think it is still reasonable to proceed with a laparoscopic appendectomy.    Plan:   Plan for laparoscopic appendectomy later today  Would appreciate medical clearance  N.p.o.  Potentially could discharge after surgery.    Max Guzman MD  General  Surgeon  [facility]  Surgery [address]  [address]  [place] [address]?  Office: [phone]

## 2022-10-04 NOTE — ED PROVIDER NOTES
EMERGENCY DEPARTMENT ENCOUnter      NAME: Tommie Rizo  AGE: 72 year old male  YOB: 1950  MRN: [id number]  EVALUATION DATE & TIME: No admission date for patient encounter.    PCP: No primary care provider on file.    ED PROVIDER: Rody Juárez MD      Chief Complaint   Patient presents with     Abdominal Pain         FINAL IMPRESSION:  1. Abdominal pain, right lower quadrant          ED COURSE & MEDICAL DECISION MAKING:      In summary, the patient is a 72-year-old male that presents to the emergency department for evaluation of right lower quadrant abdominal pain.  SPECT his pain is secondary to appendicitis.  It could also be secondary to diverticulitis, bowel obstruction, or ureteral colic from ureteral lithiasis.  5:10 AM I met with the patient, obtained history, performed an initial exam, and discussed options and plan for diagnostics and treatment here in the ED. morphine 4 mg IV was administered for pain.  Zofran 4 mg IV was administered for nausea.  Normal saline 1 L IV was administered for IV hydration.  0610-reevaluation reveals that his pain is improved in the emergency department.  Signed out at change of shift with CT scan pending      At the conclusion of the encounter I discussed the results of all of the tests and the disposition. The questions were answered. The patient or family acknowledged understanding and was agreeable with the care plan.         MEDICATIONS GIVEN IN THE EMERGENCY:  Medications   0.9% sodium chloride BOLUS (1,000 mLs Intravenous New Bag 10/4/22 0528)   ondansetron (ZOFRAN) injection 4 mg (4 mg Intravenous Given 10/4/22 0530)   morphine (PF) injection 4 mg (4 mg Intravenous Given 10/4/22 0531)       NEW PRESCRIPTIONS STARTED AT TODAY'S ER VISIT  New Prescriptions    No medications on file          =================================================================    HPI        Tommie Rizo is a 72 year old male with a pertinent history of RBBB,  COPD, aortic ectasia who presents to this ED via private vehicle for evaluation of abdominal pain.     For the past couple of days (10/01/2022), patient has been feeling intermittent right sided abdominal pain. Patient states that when he rolls over from bed, pain intensifies. Yesterday (10/03/2022), patient lost balance several times resulting in falls. Patient was not injured during any falls. Patient also endorses episodes of vomiting and diarrhea. Patient has been taking tylenol, which helps alleviate some of the pain.     Today, patient woke up with severe right sided abdominal pain. Patient was concerned about appendicitis and so he presented to ED. At present, patient states his abdominal pain has subsided and is currently a 2/10 on the pain scale. He also endorses slight shortness of breath when pain is present. Patient denies fever, chills, dysuria, hematuria, or any other complaints at this time.     Of note, has never had these symptoms before. Patient endorses social history of smoking but has not had an alcoholic dink in quite some time.     REVIEW OF SYSTEMS     Constitutional:  Denies fever or chills  HENT:  Denies sore throat   Respiratory:  Positive for shortness of breath. Denies cough  Cardiovascular:  Denies chest pain or palpitations  GI:  Positive for abdominal pain, vomiting, and diarrhea. Negative for nausea  : Denies hematuria or dysuria.   Musculoskeletal:  Denies any new extremity pain   Skin:  Denies rash   Neurologic:  Positive for loss of balance. Denies headache.   All other systems reviewed and are negative      PAST MEDICAL HISTORY:  Right bundle branch block, COPD  PAST SURGICAL HISTORY:  No past surgical history on file.        CURRENT MEDICATIONS:    varenicline (CHANTIX) 1 mg tablet        ALLERGIES:  No Known Allergies    FAMILY HISTORY:  No family history on file.    SOCIAL HISTORY:   Social History     Socioeconomic History     Marital status:        VITALS:  Patient  Vitals for the past 24 hrs:   BP Temp Temp src Pulse Resp SpO2 Weight   10/04/22 0600 102/56 -- -- 81 13 94 % --   10/04/22 0556 103/56 -- -- 85 24 93 % --   10/04/22 0545 98/58 -- -- 90 26 92 % --   10/04/22 0503 -- -- -- 104 -- -- --   10/04/22 0500 101/58 99.2  F (37.3  C) Temporal -- 16 94 % 72.6 kg (160 lb)       PHYSICAL EXAM    Constitutional:  Well developed, Well nourished,  HENT:  Normocephalic, Atraumatic, Bilateral external ears normal, Oropharynx moist, Nose normal.   Neck:  Normal range of motion, No meningismus, No stridor.   Eyes:  EOMI, Conjunctiva normal, No discharge.   Respiratory:  Normal breath sounds, No respiratory distress, No wheezing, No chest tenderness.   Cardiovascular:  Normal heart rate, Normal rhythm, No murmurs  GI:  Soft,  Tenderness right lower quadrant, No guarding, No CVA tenderness.   Musculoskeletal:  Neurovascularly intact distally, No edema, No tenderness, No cyanosis, Good range of motion in all major joints. No tenderness to palpation or major deformities noted.   Integument:  Warm, Dry, No erythema, No rash.   Lymphatic:  No lymphadenopathy noted.   Neurologic:  Alert & oriented x 3, Normal motor function, Normal sensory function, No focal deficits noted.   Psychiatric:  Affect normal, Judgment normal, Mood normal.      LAB:  All pertinent labs reviewed and interpreted.  Results for orders placed or performed during the hospital encounter of 10/04/22   Result Value Ref Range    INR 1.05 0.85 - 1.15   Comprehensive metabolic panel   Result Value Ref Range    Sodium 135 (L) 136 - 145 mmol/L    Potassium 4.1 3.5 - 5.0 mmol/L    Chloride 101 98 - 107 mmol/L    Carbon Dioxide (CO2) 24 22 - 31 mmol/L    Anion Gap 10 5 - 18 mmol/L    Urea Nitrogen 21 8 - 28 mg/dL    Creatinine 1.33 (H) 0.70 - 1.30 mg/dL    Calcium 9.3 8.5 - 10.5 mg/dL    Glucose 141 (H) 70 - 125 mg/dL    Alkaline Phosphatase 51 45 - 120 U/L    AST 11 0 - 40 U/L    ALT 10 0 - 45 U/L    Protein Total 7.2 6.0 - 8.0  g/dL    Albumin 3.3 (L) 3.5 - 5.0 g/dL    Bilirubin Total 1.1 (H) 0.0 - 1.0 mg/dL    GFR Estimate 57 (L) >60 mL/min/1.73m2   Result Value Ref Range    Lipase 23 0 - 52 U/L   Lactic acid whole blood   Result Value Ref Range    Lactic Acid 1.4 0.7 - 2.0 mmol/L   CRP inflammation   Result Value Ref Range    CRP 14.2 (H) 0.0 - <0.8 mg/dL   Result Value Ref Range    Troponin I <0.01 0.00 - 0.29 ng/mL   CBC with platelets and differential   Result Value Ref Range    WBC Count 14.3 (H) 4.0 - 11.0 10e3/uL    RBC Count 3.73 (L) 4.40 - 5.90 10e6/uL    Hemoglobin 12.7 (L) 13.3 - 17.7 g/dL    Hematocrit 35.8 (L) 40.0 - 53.0 %    MCV 96 78 - 100 fL    MCH 34.0 (H) 26.5 - 33.0 pg    MCHC 35.5 31.5 - 36.5 g/dL    RDW 12.9 10.0 - 15.0 %    Platelet Count 182 150 - 450 10e3/uL    % Neutrophils 91 %    % Lymphocytes 4 %    % Monocytes 5 %    % Eosinophils 0 %    % Basophils 0 %    % Immature Granulocytes 0 %    NRBCs per 100 WBC 0 <1 /100    Absolute Neutrophils 12.9 (H) 1.6 - 8.3 10e3/uL    Absolute Lymphocytes 0.6 (L) 0.8 - 5.3 10e3/uL    Absolute Monocytes 0.8 0.0 - 1.3 10e3/uL    Absolute Eosinophils 0.0 0.0 - 0.7 10e3/uL    Absolute Basophils 0.0 0.0 - 0.2 10e3/uL    Absolute Immature Granulocytes 0.0 <=0.4 10e3/uL    Absolute NRBCs 0.0 10e3/uL       RADIOLOGY:    CT Abdomen Pelvis w Contrast    (Results Pending)       EK-rate is 86, sinus, left axis deviation, right bundle branch block, there is no ST segment elevation or depression appreciated    I have independently reviewed and interpreted this EKG          I, Ifeoma Peña, am serving as a scribe to document services personally performed by Dr. Susanna-Lidahl based on my observation and the provider's statements to me. I, Rody Juárez MD attest that Ifeoma Peña is acting in a scribe capacity, has observed my performance of the services and has documented them in accordance with my direction.    Rody Juárez MD  Emergency Medicine  [facility]  [facility] EMERGENCY ROOM  [address] [facility] [address]  [phone]  Dept: [phone]     Rody Juárez MD  10/04/22 0610

## 2022-10-04 NOTE — PROGRESS NOTES
Surgery note:    H&P reviewed, consult reviewed from Dr. Guzman.  Acute appendicitis.  History of 50-pack-year smoking with alcohol consumption.  Plan for laparoscopic appendectomy.  Patient will remain in the hospital overnight for observation.  Risk and benefits of the procedure explained in detail.  He has consented to proceed.    Anand Damian DO [facility]  [phone]

## 2022-10-04 NOTE — ED NOTES
EMERGENCY DEPARTMENT SIGN OUT NOTE        ED COURSE AND MEDICAL DECISION MAKING  Patient was signed out to me by Dr Rody Juárez at 6:00 AM.    In brief, Tommie Rizo is a 72 year old male who initially presented to the ED for evaluation of 2 days of intermittent RLQ abdominal pain with associated vomiting and diarrhea. WBC and CRP elevated.    At time of sign out, disposition was pending CT Abdomen/Pelvis.  CT scan confirms appendicitis.  Patient otherwise stable.  Discussed the results with the patient.  Consulted with general surgery, plan is to take him to the OR today.    7:22 AM Patient's CT shows appendicitis, call placed to general surgery.  8:03 AM Rechecked and updated the patient. Discussed plan to consult with general surgery and likelihood of surgery today.  8:15 AM Zosyn ordered.  8:22 AM Spoke with Dr. Guzman, general surgery. We discussed the patient's case, plan for OR today. The patient will be admitted to the surgical service.    FINAL IMPRESSION    1. Abdominal pain, right lower quadrant    2. Acute appendicitis, unspecified acute appendicitis type        ED MEDS  Medications   piperacillin-tazobactam (ZOSYN) 3.375 g vial to attach to  mL bag (3.375 g Intravenous Given 10/4/22 0815)   0.9% sodium chloride BOLUS (0 mLs Intravenous Stopped 10/4/22 0632)   ondansetron (ZOFRAN) injection 4 mg (4 mg Intravenous Given 10/4/22 0530)   morphine (PF) injection 4 mg (4 mg Intravenous Given 10/4/22 0531)   iopamidol (ISOVUE-370) solution 75 mL (75 mLs Intravenous Given 10/4/22 0641)       LAB  Labs Ordered and Resulted from Time of ED Arrival to Time of ED Departure   COMPREHENSIVE METABOLIC PANEL - Abnormal       Result Value    Sodium 135 (*)     Potassium 4.1      Chloride 101      Carbon Dioxide (CO2) 24      Anion Gap 10      Urea Nitrogen 21      Creatinine 1.33 (*)     Calcium 9.3      Glucose 141 (*)     Alkaline Phosphatase 51      AST 11      ALT 10      Protein Total 7.2       Albumin 3.3 (*)     Bilirubin Total 1.1 (*)     GFR Estimate 57 (*)    CRP INFLAMMATION - Abnormal    CRP 14.2 (*)    ROUTINE UA WITH MICROSCOPIC REFLEX TO CULTURE - Abnormal    Color Urine Light Orange (*)     Appearance Urine Clear      Glucose Urine Negative      Bilirubin Urine Negative      Ketones Urine Trace (*)     Specific Gravity Urine >1.050 (*)     Blood Urine Negative      pH Urine 8.0 (*)     Protein Albumin Urine 30  (*)     Urobilinogen Urine 3.0 (*)     Nitrite Urine Negative      Leukocyte Esterase Urine Negative      RBC Urine 3 (*)     WBC Urine 1     CBC WITH PLATELETS AND DIFFERENTIAL - Abnormal    WBC Count 14.3 (*)     RBC Count 3.73 (*)     Hemoglobin 12.7 (*)     Hematocrit 35.8 (*)     MCV 96      MCH 34.0 (*)     MCHC 35.5      RDW 12.9      Platelet Count 182      % Neutrophils 91      % Lymphocytes 4      % Monocytes 5      % Eosinophils 0      % Basophils 0      % Immature Granulocytes 0      NRBCs per 100 WBC 0      Absolute Neutrophils 12.9 (*)     Absolute Lymphocytes 0.6 (*)     Absolute Monocytes 0.8      Absolute Eosinophils 0.0      Absolute Basophils 0.0      Absolute Immature Granulocytes 0.0      Absolute NRBCs 0.0     INR - Normal    INR 1.05     LIPASE - Normal    Lipase 23     LACTIC ACID WHOLE BLOOD - Normal    Lactic Acid 1.4     TROPONIN I - Normal    Troponin I <0.01     COVID-19 VIRUS (CORONAVIRUS) BY PCR - Normal    SARS CoV2 PCR Negative         RADIOLOGY    CT Abdomen Pelvis w Contrast   Final Result   IMPRESSION:    1.  Evidence for acute appendicitis with appendiceal dilatation at 1.2 cm with wall thickening and moderate surrounding inflammatory change. No evidence for luminal appendicolith.      2.  No perforation or abscess formation.      3.  Colonic diverticulosis without diverticulitis.      4.  Moderate bibasilar emphysema.      5.  Left adrenal nodule, no further follow-up, given its stability since prior study.          DISCHARGE MEDS  New  Prescriptions    No medications on file         I, Smith Duncan, am serving as a scribe to document services personally performed by Ranjith Castro D.O. based on my observations and the provider's statements to me.  I, Ranjith Castro D.O., attest that Smith Duncan is acting in a scribe capacity, has observed my performance of the services and has documented them in accordance with my direction.      Ranjith Castro D.O.  [facility] EMERGENCY ROOM  [facility] [facility] [address] [phone]     Ranjith Castro,   10/04/22 5392      Addendum:    General surgery requested that the patient be admitted to the hospitalist at this time, did discuss with Dr. Greg Harper who agreed to the admission.     Ranjith Castro, DO  10/04/22 7425

## 2022-10-05 VITALS
TEMPERATURE: 98 F | DIASTOLIC BLOOD PRESSURE: 72 MMHG | BODY MASS INDEX: 20.22 KG/M2 | HEIGHT: 71 IN | OXYGEN SATURATION: 93 % | WEIGHT: 144.4 LBS | RESPIRATION RATE: 16 BRPM | HEART RATE: 70 BPM | SYSTOLIC BLOOD PRESSURE: 115 MMHG

## 2022-10-05 LAB
ALBUMIN SERPL-MCNC: 2.8 G/DL (ref 3.5–5)
ALP SERPL-CCNC: 47 U/L (ref 45–120)
ALT SERPL W P-5'-P-CCNC: <9 U/L (ref 0–45)
ANION GAP SERPL CALCULATED.3IONS-SCNC: 7 MMOL/L (ref 5–18)
AST SERPL W P-5'-P-CCNC: 11 U/L (ref 0–40)
BILIRUB DIRECT SERPL-MCNC: 0.2 MG/DL
BILIRUB SERPL-MCNC: 0.4 MG/DL (ref 0–1)
BUN SERPL-MCNC: 24 MG/DL (ref 8–28)
CALCIUM SERPL-MCNC: 9 MG/DL (ref 8.5–10.5)
CHLORIDE BLD-SCNC: 107 MMOL/L (ref 98–107)
CO2 SERPL-SCNC: 24 MMOL/L (ref 22–31)
CREAT SERPL-MCNC: 1.01 MG/DL (ref 0.7–1.3)
ERYTHROCYTE [DISTWIDTH] IN BLOOD BY AUTOMATED COUNT: 13.1 % (ref 10–15)
GFR SERPL CREATININE-BSD FRML MDRD: 80 ML/MIN/1.73M2
GLUCOSE BLD-MCNC: 148 MG/DL (ref 70–125)
GLUCOSE BLDC GLUCOMTR-MCNC: 154 MG/DL (ref 70–99)
HCT VFR BLD AUTO: 32.9 % (ref 40–53)
HGB BLD-MCNC: 11.2 G/DL (ref 13.3–17.7)
MCH RBC QN AUTO: 33.6 PG (ref 26.5–33)
MCHC RBC AUTO-ENTMCNC: 34 G/DL (ref 31.5–36.5)
MCV RBC AUTO: 99 FL (ref 78–100)
PLATELET # BLD AUTO: 176 10E3/UL (ref 150–450)
POTASSIUM BLD-SCNC: 4.1 MMOL/L (ref 3.5–5)
PROT SERPL-MCNC: 6.8 G/DL (ref 6–8)
RBC # BLD AUTO: 3.33 10E6/UL (ref 4.4–5.9)
SODIUM SERPL-SCNC: 138 MMOL/L (ref 136–145)
WBC # BLD AUTO: 15.9 10E3/UL (ref 4–11)

## 2022-10-05 PROCEDURE — 250N000011 HC RX IP 250 OP 636: Performed by: SURGERY

## 2022-10-05 PROCEDURE — 99217 PR OBSERVATION CARE DISCHARGE: CPT | Performed by: STUDENT IN AN ORGANIZED HEALTH CARE EDUCATION/TRAINING PROGRAM

## 2022-10-05 PROCEDURE — 82040 ASSAY OF SERUM ALBUMIN: CPT | Performed by: STUDENT IN AN ORGANIZED HEALTH CARE EDUCATION/TRAINING PROGRAM

## 2022-10-05 PROCEDURE — 80053 COMPREHEN METABOLIC PANEL: CPT | Performed by: STUDENT IN AN ORGANIZED HEALTH CARE EDUCATION/TRAINING PROGRAM

## 2022-10-05 PROCEDURE — 99024 POSTOP FOLLOW-UP VISIT: CPT | Performed by: SURGERY

## 2022-10-05 PROCEDURE — 96372 THER/PROPH/DIAG INJ SC/IM: CPT | Performed by: SURGERY

## 2022-10-05 PROCEDURE — 250N000013 HC RX MED GY IP 250 OP 250 PS 637: Performed by: SURGERY

## 2022-10-05 PROCEDURE — 85027 COMPLETE CBC AUTOMATED: CPT | Performed by: STUDENT IN AN ORGANIZED HEALTH CARE EDUCATION/TRAINING PROGRAM

## 2022-10-05 PROCEDURE — 82962 GLUCOSE BLOOD TEST: CPT

## 2022-10-05 PROCEDURE — 82248 BILIRUBIN DIRECT: CPT | Performed by: STUDENT IN AN ORGANIZED HEALTH CARE EDUCATION/TRAINING PROGRAM

## 2022-10-05 PROCEDURE — 36415 COLL VENOUS BLD VENIPUNCTURE: CPT | Performed by: STUDENT IN AN ORGANIZED HEALTH CARE EDUCATION/TRAINING PROGRAM

## 2022-10-05 RX ORDER — OXYCODONE AND ACETAMINOPHEN 5; 325 MG/1; MG/1
1 TABLET ORAL EVERY 6 HOURS PRN
Qty: 12 TABLET | Refills: 0 | Status: SHIPPED | OUTPATIENT
Start: 2022-10-05 | End: 2022-10-08

## 2022-10-05 RX ORDER — DOCUSATE SODIUM 100 MG/1
100 CAPSULE, LIQUID FILLED ORAL 2 TIMES DAILY
Qty: 30 CAPSULE | Refills: 0 | Status: SHIPPED | OUTPATIENT
Start: 2022-10-05

## 2022-10-05 RX ADMIN — ENOXAPARIN SODIUM 40 MG: 100 INJECTION SUBCUTANEOUS at 10:37

## 2022-10-05 RX ADMIN — ACETAMINOPHEN 975 MG: 325 TABLET, FILM COATED ORAL at 10:37

## 2022-10-05 RX ADMIN — PIPERACILLIN AND TAZOBACTAM 3.38 G: 3; .375 INJECTION, POWDER, FOR SOLUTION INTRAVENOUS at 06:09

## 2022-10-05 RX ADMIN — ACETAMINOPHEN 975 MG: 325 TABLET, FILM COATED ORAL at 02:22

## 2022-10-05 RX ADMIN — SENNOSIDES AND DOCUSATE SODIUM 1 TABLET: 50; 8.6 TABLET ORAL at 08:56

## 2022-10-05 RX ADMIN — POLYETHYLENE GLYCOL 3350 17 G: 17 POWDER, FOR SOLUTION ORAL at 08:56

## 2022-10-05 ASSESSMENT — ACTIVITIES OF DAILY LIVING (ADL)
DEPENDENT_IADLS:: INDEPENDENT
ADLS_ACUITY_SCORE: 20
ADLS_ACUITY_SCORE: 22

## 2022-10-05 NOTE — ANESTHESIA POSTPROCEDURE EVALUATION
Patient: Tommie Rizo    Procedure: Procedure(s):  APPENDECTOMY, LAPAROSCOPIC       Anesthesia Type:  General    Note:     Postop Pain Control: Uneventful            Sign Out: Well controlled pain   PONV: No   Neuro/Psych: Uneventful            Sign Out: Acceptable/Baseline neuro status   Airway/Respiratory: Uneventful            Sign Out: Acceptable/Baseline resp. status   CV/Hemodynamics: Uneventful            Sign Out: Acceptable CV status   Other NRE: NONE   DID A NON-ROUTINE EVENT OCCUR? No           Last vitals:  Vitals Value Taken Time   BP 95/57 10/04/22 1801   Temp 37.1  C (98.7  F) 10/04/22 1723   Pulse 72 10/04/22 1805   Resp 15 10/04/22 1802   SpO2 96 % 10/04/22 1806   Vitals shown include unvalidated device data.    Electronically Signed By: Logan Alejandre MD  October 4, 2022  10:16 PM

## 2022-10-05 NOTE — DISCHARGE SUMMARY
[facility]  Hospitalist Discharge Summary      Date of Admission:  10/4/2022  Date of Discharge:  10/5/2022 11:47 AM  Discharging Provider: Greg Harper MD  Discharge Service: Hospitalist Service    Discharge Diagnoses   Acute appendicitis    Follow-ups Needed After Discharge   Follow-up Appointments     Follow-up and recommended labs and tests      Please follow-up with your surgeon in the next 1 to 2 weeks unless   otherwise specified.  You may call [phone] to schedule a follow-up appointment.  During your phone call to schedule a follow-up, you [place] [facility] [place]         Follow-up and recommended labs and tests       Follow up with primary care provider, Physician No Ref-Primary, within   3-5 days, for hospital follow- up.  Consider follow up hgb and wbc.             Unresulted Labs Ordered in the Past 30 Days of this Admission     Date and Time Order Name Status Description    10/4/2022  5:19 PM Surgical Pathology Exam In process           Discharge Disposition   Discharged to home  Condition at discharge: Stable    Hospital Course     Tommie Rizo is a 72 year old male admitted on 10/4/2022. He has a past medical history of COPD, ongoing tobacco use, who presented to the emergency department with 2 days of intermittent and progressive right lower quadrant pain.  He had associated nausea.  No fevers, no other noted changes with lifestyle or new medications. General Surgery evaluated.  His EKG demonstrated right bundle branch block, and he did have a repeat echocardiogram (prior was 3 years ago) prior to intervention.    He underwent a laparoscopic appendectomy by Dr. Damian on 10/04/22 with a postprocedure diagnosis of necrotic appendicitis, with an estimated blood loss of 10 cc.  Notable that the patient received Zosyn while in the ED, which was repeated by the hospitalist while awaiting the procedure.  He was  discharged with a course of Augmentin as prescribed by the surgical team.  He remained vitally and hemodynamically stable and was discharged in stable medical condition to back home.  He did receive some Colace as well as Percocet.  He did sustain an acute kidney injury which resolved by time of discharge.  He should follow-up with his primary care provider within a week for a posthospital visit.  His hemoglobin was stable postoperatively.      Consultations This Hospital Stay   None    Code Status   Prior    Time Spent on this Encounter   I, Greg Harper MD, personally saw the patient today and spent greater than 30 minutes discharging this patient.       Greg Harper MD  [address] [address]  Phone: [phone]  Fax: [phone]  ______________________________________________________________________    Physical Exam   Vital Signs: Temp: 98  F (36.7  C) Temp src: Oral BP: 115/72 Pulse: 70   Resp: 16 SpO2: 93 % O2 Device: None (Room air) Oxygen Delivery: 10 LPM  Weight: 144 lbs 6.4 oz      GENERAL:  Alert, appears comfortable, in no acute distress, appears stated age   HEAD:  Normocephalic, without obvious abnormality, atraumatic   EYES:  PERRL, conjunctiva/corneas clear, no scleral icterus, EOM's intact   NOSE: Nares normal, septum midline, mucosa normal, no drainage   THROAT: Lips, mucosa, and tongue normal; teeth and gums normal, mouth moist   NECK: Supple, symmetrical, trachea midline   BACK:   Symmetric, no curvature, ROM normal   LUNGS:   Clear to auscultation bilaterally, no rales, rhonchi, or wheezing, symmetric chest rise on inhalation, respirations unlabored, on room air    CHEST WALL:  No tenderness or conspicuous deformity   HEART:  Regular rate and rhythm, S1 and S2 normal, no murmur, rub, or gallop, no conspicuous jugular venous distention noted, peripheral pulses intact    ABDOMEN:    There was epigastric dressing that  had some dried blood, no active discharge or leak; chest was intact.  Soft, non-tender in all quadrants and no rebound or guarding, bowel sounds auscultated in all four quadrants, no masses, no organomegaly, no rebound or guarding   EXTREMITIES: Extremities normal, atraumatic, no cyanosis or edema    SKIN: Dry to touch, no exanthems in the visualized areas   NEURO: Alert, oriented x3, moves all four extremities of their own volition    PSYCH: Cooperative and behavior is appropriate           Primary Care Physician   Physician No Ref-Primary    Discharge Orders      Follow-up and recommended labs and tests    Please follow-up with your surgeon in the next 1 to 2 weeks unless otherwise specified.  You may call [phone] to schedule a follow-up appointment.  During your phone call to schedule a follow-up, you may request to be seen either in [facility] [place] or [place], [place]     Activity    Please avoid lifting over 20 pounds, pushing, pulling, twisting, turning, jumping  for approximately 2 weeks or until cleared by your surgeon.  Please avoid any other strenuous activities that have not been mentioned until cleared by your surgeon.     Reason for your hospital stay    Acute appendicitis     Reason for your hospital stay    Acute appendicitis     Follow-up and recommended labs and tests     Follow up with primary care provider, Physician No Ref-Primary, within 3-5 days, for hospital follow- up.  Consider follow up hgb and wbc.     Activity    Your activity upon discharge: activity as tolerated and as per surgery     Diet    Follow this diet upon discharge: Regular diet as tolerated       Significant Results and Procedures   Results for orders placed or performed during the hospital encounter of 10/04/22   CT Abdomen Pelvis w Contrast    Narrative    EXAM: CT ABDOMEN PELVIS W CONTRAST  LOCATION: [facility]  DATE/TIME: 10/4/2022 6:49 AM    INDICATION: Right lower quadrant  "abdominal pain  COMPARISON: CT 12/28/2018  TECHNIQUE: CT scan of the abdomen and pelvis was performed following injection of IV contrast. Multiplanar reformats were obtained. Dose reduction techniques were used.  CONTRAST: 75ml Isovue 370    FINDINGS:   LOWER CHEST: Moderate emphysema. No infiltrates or effusions.    HEPATOBILIARY: Normal.    PANCREAS: Normal.    SPLEEN: Normal.    ADRENAL GLANDS: Unchanged left adrenal nodule, no further follow-up. Right adrenal gland negative.    KIDNEYS/BLADDER: Symmetric renal enhancement. Left upper pole renal cyst, present on the prior study, therefore no further follow-up. Right lower pole renal cyst. Multiple subcentimeter low-attenuation lesions within both kidneys for which no further   follow-up recommendations are necessary. No urinary collecting system dilatation or calculi. Bladder unremarkable.    BOWEL: Acute appendicitis with enlargement of the appendix at 1.2 cm with wall thickening and moderate surrounding inflammatory change. No perforation or abscess formation. No bowel dilatation. Colonic diverticulosis.    LYMPH NODES: No lymphadenopathy.    VASCULATURE: Normal caliber aorta. Minimal vascular calcification.    PELVIC ORGANS: Unremarkable.    MUSCULOSKELETAL: Minimal to moderate degenerative disc changes throughout the lower thoracic and lumbar spine.      Impression    IMPRESSION:   1.  Evidence for acute appendicitis with appendiceal dilatation at 1.2 cm with wall thickening and moderate surrounding inflammatory change. No evidence for luminal appendicolith.    2.  No perforation or abscess formation.    3.  Colonic diverticulosis without diverticulitis.    4.  Moderate bibasilar emphysema.    5.  Left adrenal nodule, no further follow-up, given its stability since prior study.   POC US Guidance Needle Placement    Narrative    Ultrasound was performed as guidance to an anesthesia procedure.  Click   \"PACS images\" hyperlink below to view any stored images.  " For specific   procedure details, view procedure note authored by anesthesia.   Echocardiogram Complete     Value    LVEF  60-65%    [facility]    [id number]  [id number]  [id number]  [id number]^PRADEEP^DEBBIE^[place] [address]     Name: ROSENDO HAYWOOD  MRN: [id number]  : 1950  Study Date: 10/04/2022 02:03 PM  Age: 72 yrs  Gender: Male  Patient Location: [facility]  Reason For Study: RBBB  Ordering Physician: DEBBIE FERNÁNDEZ  Performed By: YASMIN     BSA: 1.9 m2  Height: 71 in  Weight: 160 lb  HR: 74  BP: 100/59 mmHg  ______________________________________________________________________________  Procedure  Complete Portable Echo Adult.  ______________________________________________________________________________  Interpretation Summary     1. The left ventricle is normal in size. Left ventricular function is  normal.The ejection fraction is 60-65%. There is normal left ventricular wall  thickness. No regional wall motion abnormalities noted.  2. Normal right ventricle size and systolic function.  3. The left atrium is borderline dilated.  4. No hemodynamically significant valvular abnormalities on 2D or color flow  imaging.  ______________________________________________________________________________  Left Ventricle  The left ventricle is normal in size. Left ventricular function is normal.The  ejection fraction is 60-65%. There is normal left ventricular wall thickness.  No regional wall motion abnormalities noted.     Right Ventricle  Normal right ventricle size and systolic function.     Atria  The left atrium is borderline dilated. Right atrial size is normal. There is  no color Doppler evidence of an atrial shunt.     Mitral Valve  Mitral valve leaflets appear normal. There is no evidence of mitral stenosis  or clinically significant mitral regurgitation.     Tricuspid Valve  Tricuspid valve leaflets appear normal. There is no evidence of tricuspid  stenosis or clinically  significant tricuspid regurgitation. There is trace  tricuspid regurgitation. The right ventricular systolic pressure is  approximated at 28.0 mmHg plus the right atrial pressure. There is no  tricuspid stenosis.     Aortic Valve  There is mild trileaflet aortic sclerosis. No aortic regurgitation is present.  No aortic stenosis is present.     Pulmonic Valve  This degree of valvular regurgitation is within normal limits. There is trace  pulmonic valvular regurgitation.     Vessels  The aorta root is normal. Normal size ascending aorta. IVC diameter <2.1 cm  collapsing >50% with sniff suggests a normal RA pressure of 3 mmHg.     Pericardium  There is no pericardial effusion.     ______________________________________________________________________________  MMode/2D Measurements & Calculations  IVSd: 0.85 cm  LVIDd: 5.4 cm  LVIDs: 3.8 cm  LVPWd: 0.63 cm  FS: 29.8 %     LV mass(C)d: 139.9 grams  LV mass(C)dI: 73.0 grams/m2  Ao root diam: 3.1 cm  LA dimension: 3.2 cm  asc Aorta Diam: 3.4 cm  LA/Ao: 1.0  LVOT diam: 2.6 cm  LVOT area: 5.3 cm2  LA Volume Indexed (AL/bp): 30.6 ml/m2  RWT: 0.23     Time Measurements  MM HR: 74.0 BPM     Doppler Measurements & Calculations  Ao V2 max: 122.5 cm/sec  Ao max P.0 mmHg  Ao V2 mean: 87.8 cm/sec  Ao mean PG: 3.4 mmHg  Ao V2 VTI: 26.9 cm  ANNIE(I,D): 3.9 cm2  ANNIE(V,D): 4.2 cm2  LV V1 max PG: 3.7 mmHg  LV V1 max: 96.0 cm/sec  LV V1 VTI: 19.8 cm  SV(LVOT): 105.0 ml  SI(LVOT): 54.7 ml/m2  PA acc time: 0.17 sec  TR max salbador: 264.7 cm/sec  TR max P.0 mmHg  AV Salbador Ratio (DI): 0.78  ANNIE Index (cm2/m2): 2.0  Peak E' Salbador: 15.0 cm/sec     ______________________________________________________________________________  Report approved by: Héctor Monsivais 10/04/2022 04:50 PM               Discharge Medications   Discharge Medication List as of 10/5/2022 11:10 AM      START taking these medications    Details   amoxicillin-clavulanate (AUGMENTIN) 875-125 MG tablet Take 1 tablet by  mouth 2 times daily, Disp-8 tablet, R-0, E-Prescribe      docusate sodium (COLACE) 100 MG capsule Take 1 capsule (100 mg) by mouth 2 times daily, Disp-30 capsule, R-0, E-Prescribe      oxyCODONE-acetaminophen (PERCOCET) 5-325 MG tablet Take 1 tablet by mouth every 6 hours as needed for pain, Disp-12 tablet, R-0, E-Prescribe           Allergies   No Known Allergies

## 2022-10-05 NOTE — PROGRESS NOTES
Care Management Discharge Note    Discharge Date: 10/05/2022       Discharge Disposition: Home    Discharge Services: None    Discharge DME: None    Discharge Transportation: family or friend will provide    Private pay costs discussed: Not applicable    PAS Confirmation Code:  (N/A)  Patient/family educated on Medicare website which has current facility and service quality ratings: no    Education Provided on the Discharge Plan:  Yes   Persons Notified of Discharge Plans: Pt and Wife   Patient/Family in Agreement with the Plan: yes    Handoff Referral Completed: No    Additional Information:  SWCM met briefly and introduced self and CM services to Pt and wife who was visiting.  Pt lives with spouse.  [place] reviewed.  Pt discharging home today and no CM needs identified or desired.  Family to transport.     AVELINO Griffin

## 2022-10-05 NOTE — DISCHARGE INSTRUCTIONS
Please call your surgeon, Dr. Damian, at [phone] to schedule a follow-up appointment regarding follow-up discussions and or/visits relating to surgery.  You may also call if you have any questions or concerns regarding her hospital stay and any other surgical issue you may have.     ACTIVITIES:  No heavy lifting over 20 pounds or strenuous activities for a total of 2 weeks      WOUND CARE:  May shower 24 hours after surgery, no baths or hot tubs for a total of 2 weeks.  May remove the top superficial dressings if not already done.  If you have white stickers over the incisions these will stay on for a total of 3 to 4 weeks

## 2022-10-05 NOTE — PROGRESS NOTES
"PRIMARY DIAGNOSIS: \"GENERIC\" NURSING  OUTPATIENT/OBSERVATION GOALS TO BE MET BEFORE DISCHARGE:  1. ADLs back to baseline: Yes    2. Activity and level of assistance: Up with standby assistance.    3. Pain status: Pain free.    4. Return to near baseline physical activity: Yes     Discharge Planner Nurse   Safe discharge environment identified: Yes  Barriers to discharge: No       Entered by: Samantha Alarcon RN 10/04/2022 10:59 PM     Please review provider order for any additional goals.   Nurse to notify provider when observation goals have been met and patient is ready for discharge.  "

## 2022-10-05 NOTE — PLAN OF CARE
Problem: Risk for Delirium  Goal: Optimal Coping  Outcome: Ongoing, Progressing  Goal: Improved Behavioral Control  Outcome: Ongoing, Progressing  Goal: Improved Attention and Thought Clarity  Outcome: Ongoing, Progressing  Goal: Improved Sleep  Outcome: Ongoing, Progressing  PRIMARY DIAGNOSIS: ACUTE PAIN  OUTPATIENT/OBSERVATION GOALS TO BE MET BEFORE DISCHARGE:  1. Pain Status: Pain free.    2. Return to near baseline physical activity: Yes    3. Cleared for discharge by consultants (if involved): No    Discharge Planner Nurse   Safe discharge environment identified: Yes  Barriers to discharge: No       Entered by: Gogo Gipson RN 10/05/2022 5:57 AM     Please review provider order for any additional goals.   Nurse to notify provider when observation goals have been met and patient is ready for discharge.

## 2022-10-05 NOTE — PLAN OF CARE
Problem: Plan of Care - These are the overarching goals to be used throughout the patient stay.    Goal: Plan of Care Review/Shift Note  Description: The Plan of Care Review/Shift note should be completed every shift.  The Outcome Evaluation is a brief statement about your assessment that the patient is improving, declining, or no change.  This information will be displayed automatically on your shift note.  Outcome: Ongoing, Progressing  Goal: Optimal Comfort and Wellbeing  Outcome: Ongoing, Progressing     Problem: Risk for Delirium  Goal: Optimal Coping  Outcome: Ongoing, Progressing   Goal Outcome Evaluation:  Patient had laparoscopic appendectomy this evening. Returned to unit free of pain or discomfort. Vital signs stable throughout shift. No PRN pain medication administered.

## 2022-10-05 NOTE — PROGRESS NOTES
Tommie Rizo is s/p laparoscopic appendectomy for necrotic appendicitis.  He is doing well, tolerating regular diet, no bowel movements or flatus yet, pain is controlled        Vitals:    10/04/22 2127 10/04/22 2359 10/05/22 0222 10/05/22 0738   BP: 97/55 90/57 106/65 115/72   BP Location: Right arm Left arm Left arm Left arm   Patient Position: Semi-Levine's  Supine    Pulse: 69 65  70   Resp: 18 16  16   Temp: 97.5  F (36.4  C) 97.4  F (36.3  C)  98  F (36.7  C)   TempSrc: Oral Oral  Oral   SpO2: 94% 93%  93%   Weight:       Height:           PHYSICAL EXAM:  GEN: No acute distress, comfortable  ABD: Dressings intact, superior dressing with mild serosanguineous drainage, abdomen mildly distended, nontender  EXT:No cyanosis, edema or obvious abnormalities        Recent Labs   Lab 10/04/22  0521   WBC 14.3*   HGB 12.7*   HCT 35.8*          Recent Labs   Lab 10/04/22  0521   *   CO2 24   BUN 21   ALBUMIN 3.3*   ALKPHOS 51   ALT 10   AST 11         A/P:  Tommie Rizo is s/p laparoscopic appendectomy for necrotic appendicitis  -We will keep him on IV antibiotics until noon today.  At that point I will convert him to oral antibiotics and discharge him to home.  -Dietary recommendations have been given as were postoperative activity instructions.  -Plan for discharge around noon today.    Devon Damian DO  [facility]  [phone]  [facility] Department of Surgery

## 2022-10-06 LAB
PATH REPORT.COMMENTS IMP SPEC: NORMAL
PATH REPORT.COMMENTS IMP SPEC: NORMAL
PATH REPORT.FINAL DX SPEC: NORMAL
PATH REPORT.GROSS SPEC: NORMAL
PATH REPORT.MICROSCOPIC SPEC OTHER STN: NORMAL
PATH REPORT.RELEVANT HX SPEC: NORMAL
PHOTO IMAGE: NORMAL

## 2022-10-06 PROCEDURE — 88304 TISSUE EXAM BY PATHOLOGIST: CPT | Mod: 26 | Performed by: PATHOLOGY
